# Patient Record
Sex: FEMALE | Race: WHITE | NOT HISPANIC OR LATINO
[De-identification: names, ages, dates, MRNs, and addresses within clinical notes are randomized per-mention and may not be internally consistent; named-entity substitution may affect disease eponyms.]

---

## 2017-10-10 PROBLEM — Z00.00 ENCOUNTER FOR PREVENTIVE HEALTH EXAMINATION: Status: ACTIVE | Noted: 2017-10-10

## 2017-11-09 ENCOUNTER — APPOINTMENT (OUTPATIENT)
Dept: OTOLARYNGOLOGY | Facility: CLINIC | Age: 52
End: 2017-11-09
Payer: COMMERCIAL

## 2017-11-09 VITALS
SYSTOLIC BLOOD PRESSURE: 111 MMHG | DIASTOLIC BLOOD PRESSURE: 67 MMHG | OXYGEN SATURATION: 99 % | HEART RATE: 76 BPM | TEMPERATURE: 98.2 F

## 2017-11-09 VITALS — WEIGHT: 117 LBS | HEIGHT: 62 IN | BODY MASS INDEX: 21.53 KG/M2

## 2017-11-09 DIAGNOSIS — Z86.39 PERSONAL HISTORY OF OTHER ENDOCRINE, NUTRITIONAL AND METABOLIC DISEASE: ICD-10-CM

## 2017-11-09 DIAGNOSIS — R22.1 LOCALIZED SWELLING, MASS AND LUMP, NECK: ICD-10-CM

## 2017-11-09 DIAGNOSIS — J39.8 OTHER SPECIFIED DISEASES OF UPPER RESPIRATORY TRACT: ICD-10-CM

## 2017-11-09 DIAGNOSIS — Z83.49 FAMILY HISTORY OF OTHER ENDOCRINE, NUTRITIONAL AND METABOLIC DISEASES: ICD-10-CM

## 2017-11-09 DIAGNOSIS — Z78.9 OTHER SPECIFIED HEALTH STATUS: ICD-10-CM

## 2017-11-09 PROCEDURE — 99205 OFFICE O/P NEW HI 60 MIN: CPT | Mod: 25

## 2017-11-09 PROCEDURE — 31575 DIAGNOSTIC LARYNGOSCOPY: CPT

## 2017-11-09 RX ORDER — MULTIVITAMIN
TABLET ORAL
Refills: 0 | Status: ACTIVE | COMMUNITY

## 2017-11-22 ENCOUNTER — LABORATORY RESULT (OUTPATIENT)
Age: 52
End: 2017-11-22

## 2017-11-22 VITALS
RESPIRATION RATE: 16 BRPM | HEART RATE: 85 BPM | HEIGHT: 62 IN | WEIGHT: 122.36 LBS | SYSTOLIC BLOOD PRESSURE: 117 MMHG | TEMPERATURE: 98 F | DIASTOLIC BLOOD PRESSURE: 59 MMHG | OXYGEN SATURATION: 99 %

## 2017-11-22 RX ORDER — HYDROCORTISONE 25 MG/G
2.5 CREAM TOPICAL
Qty: 28 | Refills: 0 | Status: ACTIVE | COMMUNITY
Start: 2017-06-30

## 2017-11-24 ENCOUNTER — APPOINTMENT (OUTPATIENT)
Dept: OTOLARYNGOLOGY | Facility: HOSPITAL | Age: 52
End: 2017-11-24

## 2017-11-24 ENCOUNTER — OUTPATIENT (OUTPATIENT)
Dept: OUTPATIENT SERVICES | Facility: HOSPITAL | Age: 52
LOS: 1 days | Discharge: HOME | End: 2017-11-24
Payer: COMMERCIAL

## 2017-11-24 ENCOUNTER — RESULT REVIEW (OUTPATIENT)
Age: 52
End: 2017-11-24

## 2017-11-24 VITALS
DIASTOLIC BLOOD PRESSURE: 57 MMHG | TEMPERATURE: 99 F | OXYGEN SATURATION: 96 % | SYSTOLIC BLOOD PRESSURE: 116 MMHG | RESPIRATION RATE: 21 BRPM | HEART RATE: 100 BPM

## 2017-11-24 DIAGNOSIS — Z90.13 ACQUIRED ABSENCE OF BILATERAL BREASTS AND NIPPLES: Chronic | ICD-10-CM

## 2017-11-24 PROCEDURE — 88307 TISSUE EXAM BY PATHOLOGIST: CPT

## 2017-11-24 PROCEDURE — 95865 NEEDLE EMG LARYNX: CPT | Mod: 26

## 2017-11-24 PROCEDURE — 60220 PARTIAL REMOVAL OF THYROID: CPT

## 2017-11-24 PROCEDURE — 60220 PARTIAL REMOVAL OF THYROID: CPT | Mod: RT

## 2017-11-24 RX ORDER — ONDANSETRON 8 MG/1
2 TABLET, FILM COATED ORAL
Qty: 0 | Refills: 0 | Status: DISCONTINUED | OUTPATIENT
Start: 2017-11-24 | End: 2017-11-24

## 2017-11-24 RX ORDER — KETOROLAC TROMETHAMINE 30 MG/ML
15 SYRINGE (ML) INJECTION ONCE
Qty: 0 | Refills: 0 | Status: DISCONTINUED | OUTPATIENT
Start: 2017-11-24 | End: 2017-11-24

## 2017-11-24 RX ORDER — ACETAMINOPHEN 500 MG
1000 TABLET ORAL ONCE
Qty: 0 | Refills: 0 | Status: DISCONTINUED | OUTPATIENT
Start: 2017-11-24 | End: 2017-11-24

## 2017-11-24 RX ADMIN — Medication 15 MILLIGRAM(S): at 11:42

## 2017-11-24 NOTE — BRIEF OPERATIVE NOTE - OPERATION/FINDINGS
The incision was then carried down through the subcutaneous tissues using the needle tip cautery through the platysma and the strap muscles exposed. A couple of small vessels were ligated in the process. Diminutive R recurrent laryngeal nerve identified and protected during procedure. The right lobe was mobilized medially. It was intermittently adherent to the trachea and care was taken to dissect it carefully. There was no significant bleeding during the procedure once the R lobe and isthmus had been removed and sent to surg path. 10 Fr bard drain was placed. Closed primarily with 3-0 chromic for the strap muscles, and then running subcuticular. Incision carried down through the subcutaneous tissues using the needle tip cautery through the platysma and the strap muscles were exposed. Small vessels were ligated in the process. Diminutive R recurrent laryngeal nerve identified and protected during procedure. The right lobe was mobilized medially. It was intermittently adherent to the trachea and care was taken to dissect it carefully. There was no significant bleeding during the procedure once the R lobe and isthmus had been removed and sent to surg path. 10 Fr bard drain was placed. Closed primarily with vicryl for the strap muscles, and then running subcuticular.

## 2017-11-24 NOTE — PACU DISCHARGE NOTE - COMMENTS
Patient discharged home with significant other. Airway patent. Surgical dressing to neck clean, dry, intact, no swelling. Gait steady. Patient denies pain.

## 2017-11-24 NOTE — BRIEF OPERATIVE NOTE - PROCEDURE
<<-----Click on this checkbox to enter Procedure Thyroid lobectomy, total, right, with isthmusectomy  11/24/2017    Active  ABBIE

## 2017-11-27 ENCOUNTER — APPOINTMENT (OUTPATIENT)
Dept: OTOLARYNGOLOGY | Facility: CLINIC | Age: 52
End: 2017-11-27
Payer: COMMERCIAL

## 2017-11-27 VITALS
SYSTOLIC BLOOD PRESSURE: 112 MMHG | OXYGEN SATURATION: 98 % | HEART RATE: 76 BPM | DIASTOLIC BLOOD PRESSURE: 69 MMHG | TEMPERATURE: 98.4 F

## 2017-11-27 DIAGNOSIS — Z98.890 OTHER SPECIFIED POSTPROCEDURAL STATES: ICD-10-CM

## 2017-11-27 PROBLEM — E04.1 NONTOXIC SINGLE THYROID NODULE: Chronic | Status: ACTIVE | Noted: 2017-11-22

## 2017-11-27 PROBLEM — I95.9 HYPOTENSION, UNSPECIFIED: Chronic | Status: ACTIVE | Noted: 2017-11-22

## 2017-11-27 PROBLEM — C50.919 MALIGNANT NEOPLASM OF UNSPECIFIED SITE OF UNSPECIFIED FEMALE BREAST: Chronic | Status: ACTIVE | Noted: 2017-11-22

## 2017-11-27 LAB — SURGICAL PATHOLOGY STUDY: SIGNIFICANT CHANGE UP

## 2017-11-27 PROCEDURE — 99024 POSTOP FOLLOW-UP VISIT: CPT

## 2017-11-27 PROCEDURE — 31575 DIAGNOSTIC LARYNGOSCOPY: CPT | Mod: 58

## 2017-11-27 RX ORDER — AZITHROMYCIN DIHYDRATE 500 MG/1
500 TABLET, FILM COATED ORAL DAILY
Qty: 1 | Refills: 0 | Status: COMPLETED | COMMUNITY
Start: 2017-11-22 | End: 2017-11-27

## 2017-11-27 RX ORDER — ACETAMINOPHEN AND CODEINE 300; 30 MG/1; MG/1
300-30 TABLET ORAL
Qty: 18 | Refills: 0 | Status: COMPLETED | COMMUNITY
Start: 2017-11-22 | End: 2017-11-27

## 2017-12-11 ENCOUNTER — TRANSCRIPTION ENCOUNTER (OUTPATIENT)
Age: 52
End: 2017-12-11

## 2018-01-16 LAB
25(OH)D3 SERPL-MCNC: 58 NG/ML
T3FREE SERPL-MCNC: 3.01 PG/ML
T4 FREE SERPL-MCNC: 1 NG/DL
TSH SERPL-ACNC: 2.96 UIU/ML

## 2018-01-16 RX ORDER — AMOXICILLIN AND CLAVULANATE POTASSIUM 500; 125 MG/1; MG/1
500-125 TABLET, FILM COATED ORAL
Qty: 10 | Refills: 0 | Status: ACTIVE | COMMUNITY
Start: 2018-01-02

## 2018-01-18 ENCOUNTER — APPOINTMENT (OUTPATIENT)
Dept: OTOLARYNGOLOGY | Facility: CLINIC | Age: 53
End: 2018-01-18
Payer: COMMERCIAL

## 2018-01-18 VITALS
SYSTOLIC BLOOD PRESSURE: 102 MMHG | HEART RATE: 74 BPM | TEMPERATURE: 98.1 F | OXYGEN SATURATION: 97 % | DIASTOLIC BLOOD PRESSURE: 63 MMHG

## 2018-01-18 PROCEDURE — 99024 POSTOP FOLLOW-UP VISIT: CPT

## 2018-05-08 LAB
25(OH)D3 SERPL-MCNC: 50.4 NG/ML
T3FREE SERPL-MCNC: 2.7 PG/ML
T4 FREE SERPL-MCNC: 1 NG/DL
TSH SERPL-ACNC: 1.86 UIU/ML

## 2018-08-10 ENCOUNTER — LABORATORY RESULT (OUTPATIENT)
Age: 53
End: 2018-08-10

## 2018-08-16 ENCOUNTER — APPOINTMENT (OUTPATIENT)
Dept: OTOLARYNGOLOGY | Facility: CLINIC | Age: 53
End: 2018-08-16
Payer: COMMERCIAL

## 2018-08-16 VITALS — SYSTOLIC BLOOD PRESSURE: 100 MMHG | DIASTOLIC BLOOD PRESSURE: 66 MMHG | OXYGEN SATURATION: 98 % | HEART RATE: 75 BPM

## 2018-08-16 PROCEDURE — G0268 REMOVAL OF IMPACTED WAX MD: CPT

## 2018-08-16 PROCEDURE — 99213 OFFICE O/P EST LOW 20 MIN: CPT | Mod: 25

## 2018-08-18 LAB — T3FREE SERPL-MCNC: 2.53 PG/ML

## 2018-12-17 ENCOUNTER — FORM ENCOUNTER (OUTPATIENT)
Age: 53
End: 2018-12-17

## 2018-12-18 ENCOUNTER — APPOINTMENT (OUTPATIENT)
Dept: ULTRASOUND IMAGING | Facility: HOSPITAL | Age: 53
End: 2018-12-18

## 2018-12-18 ENCOUNTER — OUTPATIENT (OUTPATIENT)
Dept: OUTPATIENT SERVICES | Facility: HOSPITAL | Age: 53
LOS: 1 days | End: 2018-12-18
Payer: COMMERCIAL

## 2018-12-18 DIAGNOSIS — Z90.13 ACQUIRED ABSENCE OF BILATERAL BREASTS AND NIPPLES: Chronic | ICD-10-CM

## 2018-12-18 PROCEDURE — 76536 US EXAM OF HEAD AND NECK: CPT

## 2018-12-18 PROCEDURE — 76536 US EXAM OF HEAD AND NECK: CPT | Mod: 26

## 2019-10-03 ENCOUNTER — TRANSCRIPTION ENCOUNTER (OUTPATIENT)
Age: 54
End: 2019-10-03

## 2019-10-07 ENCOUNTER — APPOINTMENT (OUTPATIENT)
Dept: OTOLARYNGOLOGY | Facility: CLINIC | Age: 54
End: 2019-10-07
Payer: COMMERCIAL

## 2019-10-07 VITALS
TEMPERATURE: 98.1 F | SYSTOLIC BLOOD PRESSURE: 106 MMHG | OXYGEN SATURATION: 99 % | HEART RATE: 71 BPM | DIASTOLIC BLOOD PRESSURE: 67 MMHG

## 2019-10-07 PROCEDURE — G0268 REMOVAL OF IMPACTED WAX MD: CPT | Mod: LT

## 2019-10-07 PROCEDURE — 99214 OFFICE O/P EST MOD 30 MIN: CPT | Mod: 25

## 2019-10-07 RX ORDER — AZELASTINE HYDROCHLORIDE 205.5 UG/1
0.15 SPRAY, METERED NASAL
Qty: 30 | Refills: 0 | Status: ACTIVE | COMMUNITY
Start: 2019-04-30

## 2019-10-07 NOTE — CONSULT LETTER
[Dear  ___] : Dear  [unfilled], [Consult Letter:] : I had the pleasure of evaluating your patient, [unfilled]. [Please see my note below.] : Please see my note below. [Consult Closing:] : Thank you very much for allowing me to participate in the care of this patient.  If you have any questions, please do not hesitate to contact me. [Sincerely,] : Sincerely, [FreeTextEntry3] : \par Philip Milenr M.D., FACS, ECNU\par Director [facility] Thyroid [facility] Parathyroid Surgery\par [facility]\par Certified in Thyroid/Parathyroid/Neck Ultrasound, ECNU/ AIUM\par \par , Department of Otolaryngology\par [employer] [facility] [employer] [facility]\par

## 2019-10-07 NOTE — PROCEDURE
[Cerumen Impaction] : Cerumen Impaction [Same] : same as the Pre Op Dx. [] : Removal of Cerumen [FreeTextEntry1] : Cerumen AS [FreeTextEntry6] : copious cerumen removed AS with suction and curettes, TM normal

## 2019-10-07 NOTE — HISTORY OF PRESENT ILLNESS
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  She had a nodule in the right lobe that measured 3.9 x 1.8 x 2.6 cm and had started thyroid hormone.  She took this for  ~ 20 years but after her last pregnancy she stopped taking it.  She was followed for 8-9 years by Dr. Anand at [facility] and was stable on US.  She then saw Mary Acuna MD her new Endocrinologist and an FNA biopsy was done with the [facility] but not reflexed as the cytology was benign last August. She remains euthyroid but on last ultrasound in August the nodule in the right lobe had increased now to 5.28 cm.  Of interest she had started taking Kelp tablets after treatment for lobular infiltrating breast cancer treated with double mastectomy last April.  She stopped taking the Kelp in August of this year when the nodule was noted to have grown significantly despite relative stable size of the entire gland. The nodule is more visible now and she feels an intermittent sense of pressure with certain head positions but denies SOB at rest, dysphagia, neck pain or recent voice changes. Her mother had a thyroidectomy for a presumed toxic goiter/nodule but there is no family history of thyroid cancer.  She has no known radiation exposures in her youth. SHe now desires excision due to the growth of the nodule and compressive symptoms, referred by her Endocrinologist. \par  [FreeTextEntry1] : Ophelia returns for her follow up visit after a right hemithyroidectomy for a goiter on 11/24/17. She is no longer clearing her throat excessively and her voice is better.   Her surgical path was c/w adenomatous hyperplasia. She had early TFTs that were in the normal range on 1/12/17. Her TSH was slightly elevated last year.  Overall her TFTs are stable and she feels well. Her weight has been stable.  She has a subcentimeter left lobe nodule.

## 2019-10-07 NOTE — REASON FOR VISIT
[FreeTextEntry2] : a followup visit after partial thyroidectomy.  [FreeTextEntry1] : Referred by Mary Acuna MD Endocrinologist, PCP is Teo Saeed MD

## 2019-10-10 LAB
T3FREE SERPL-MCNC: 2.98 PG/ML
T4 FREE SERPL-MCNC: 1.4 NG/DL
THYROGLOB AB SERPL-ACNC: <20 IU/ML
THYROPEROXIDASE AB SERPL IA-ACNC: <10 IU/ML
TSH SERPL-ACNC: 3.01 UIU/ML

## 2019-10-27 ENCOUNTER — INPATIENT (INPATIENT)
Facility: HOSPITAL | Age: 54
LOS: 0 days | Discharge: HOME | DRG: 694 | End: 2019-10-28
Attending: UROLOGY | Admitting: UROLOGY
Payer: COMMERCIAL

## 2019-10-27 VITALS
OXYGEN SATURATION: 98 % | DIASTOLIC BLOOD PRESSURE: 55 MMHG | WEIGHT: 125 LBS | HEART RATE: 77 BPM | TEMPERATURE: 98 F | SYSTOLIC BLOOD PRESSURE: 119 MMHG | HEIGHT: 62 IN | RESPIRATION RATE: 20 BRPM

## 2019-10-27 DIAGNOSIS — N20.0 CALCULUS OF KIDNEY: ICD-10-CM

## 2019-10-27 DIAGNOSIS — Z90.13 ACQUIRED ABSENCE OF BILATERAL BREASTS AND NIPPLES: Chronic | ICD-10-CM

## 2019-10-27 LAB
ALBUMIN SERPL ELPH-MCNC: 4.4 G/DL — SIGNIFICANT CHANGE UP (ref 3.3–5)
ALP SERPL-CCNC: 76 U/L — SIGNIFICANT CHANGE UP (ref 40–120)
ALT FLD-CCNC: 30 U/L — SIGNIFICANT CHANGE UP (ref 10–45)
ANION GAP SERPL CALC-SCNC: 15 MMOL/L — SIGNIFICANT CHANGE UP (ref 5–17)
APPEARANCE UR: CLEAR — SIGNIFICANT CHANGE UP
AST SERPL-CCNC: 31 U/L — SIGNIFICANT CHANGE UP (ref 10–40)
BILIRUB SERPL-MCNC: 0.2 MG/DL — SIGNIFICANT CHANGE UP (ref 0.2–1.2)
BILIRUB UR-MCNC: NEGATIVE — SIGNIFICANT CHANGE UP
BUN SERPL-MCNC: 20 MG/DL — SIGNIFICANT CHANGE UP (ref 7–23)
CALCIUM SERPL-MCNC: 9.4 MG/DL — SIGNIFICANT CHANGE UP (ref 8.4–10.5)
CHLORIDE SERPL-SCNC: 100 MMOL/L — SIGNIFICANT CHANGE UP (ref 96–108)
CO2 SERPL-SCNC: 25 MMOL/L — SIGNIFICANT CHANGE UP (ref 22–31)
COLOR SPEC: YELLOW — SIGNIFICANT CHANGE UP
CREAT SERPL-MCNC: 0.64 MG/DL — SIGNIFICANT CHANGE UP (ref 0.5–1.3)
DIFF PNL FLD: ABNORMAL
EXTRA BLUE TOP TUBE: SIGNIFICANT CHANGE UP
EXTRA SST TUBE: SIGNIFICANT CHANGE UP
GLUCOSE SERPL-MCNC: 126 MG/DL — HIGH (ref 70–99)
GLUCOSE UR QL: NEGATIVE — SIGNIFICANT CHANGE UP
HCT VFR BLD CALC: 43.4 % — SIGNIFICANT CHANGE UP (ref 34.5–45)
HGB BLD-MCNC: 14.4 G/DL — SIGNIFICANT CHANGE UP (ref 11.5–15.5)
KETONES UR-MCNC: >=80 MG/DL
LEUKOCYTE ESTERASE UR-ACNC: NEGATIVE — SIGNIFICANT CHANGE UP
MCHC RBC-ENTMCNC: 31 PG — SIGNIFICANT CHANGE UP (ref 27–34)
MCHC RBC-ENTMCNC: 33.2 GM/DL — SIGNIFICANT CHANGE UP (ref 32–36)
MCV RBC AUTO: 93.3 FL — SIGNIFICANT CHANGE UP (ref 80–100)
NITRITE UR-MCNC: NEGATIVE — SIGNIFICANT CHANGE UP
NRBC # BLD: 0 /100 WBCS — SIGNIFICANT CHANGE UP (ref 0–0)
PH UR: 5.5 — SIGNIFICANT CHANGE UP (ref 5–8)
PLATELET # BLD AUTO: 265 K/UL — SIGNIFICANT CHANGE UP (ref 150–400)
POTASSIUM SERPL-MCNC: 3.6 MMOL/L — SIGNIFICANT CHANGE UP (ref 3.5–5.3)
POTASSIUM SERPL-SCNC: 3.6 MMOL/L — SIGNIFICANT CHANGE UP (ref 3.5–5.3)
PROT SERPL-MCNC: 7.1 G/DL — SIGNIFICANT CHANGE UP (ref 6–8.3)
PROT UR-MCNC: NEGATIVE MG/DL — SIGNIFICANT CHANGE UP
RBC # BLD: 4.65 M/UL — SIGNIFICANT CHANGE UP (ref 3.8–5.2)
RBC # FLD: 12.4 % — SIGNIFICANT CHANGE UP (ref 10.3–14.5)
SODIUM SERPL-SCNC: 140 MMOL/L — SIGNIFICANT CHANGE UP (ref 135–145)
SP GR SPEC: 1.02 — SIGNIFICANT CHANGE UP (ref 1–1.03)
UROBILINOGEN FLD QL: 0.2 E.U./DL — SIGNIFICANT CHANGE UP
WBC # BLD: 15.76 K/UL — HIGH (ref 3.8–10.5)
WBC # FLD AUTO: 15.76 K/UL — HIGH (ref 3.8–10.5)

## 2019-10-27 PROCEDURE — 74176 CT ABD & PELVIS W/O CONTRAST: CPT | Mod: 26

## 2019-10-27 PROCEDURE — 99285 EMERGENCY DEPT VISIT HI MDM: CPT

## 2019-10-27 RX ORDER — INFLUENZA VIRUS VACCINE 15; 15; 15; 15 UG/.5ML; UG/.5ML; UG/.5ML; UG/.5ML
0.5 SUSPENSION INTRAMUSCULAR ONCE
Refills: 0 | Status: DISCONTINUED | OUTPATIENT
Start: 2019-10-27 | End: 2019-10-28

## 2019-10-27 RX ORDER — CEFTRIAXONE 500 MG/1
1000 INJECTION, POWDER, FOR SOLUTION INTRAMUSCULAR; INTRAVENOUS ONCE
Refills: 0 | Status: DISCONTINUED | OUTPATIENT
Start: 2019-10-27 | End: 2019-10-27

## 2019-10-27 RX ORDER — CEFTRIAXONE 500 MG/1
1000 INJECTION, POWDER, FOR SOLUTION INTRAMUSCULAR; INTRAVENOUS ONCE
Refills: 0 | Status: COMPLETED | OUTPATIENT
Start: 2019-10-27 | End: 2019-10-27

## 2019-10-27 RX ORDER — ACETAMINOPHEN 500 MG
650 TABLET ORAL EVERY 6 HOURS
Refills: 0 | Status: DISCONTINUED | OUTPATIENT
Start: 2019-10-27 | End: 2019-10-28

## 2019-10-27 RX ORDER — ONDANSETRON 8 MG/1
4 TABLET, FILM COATED ORAL EVERY 8 HOURS
Refills: 0 | Status: DISCONTINUED | OUTPATIENT
Start: 2019-10-27 | End: 2019-10-28

## 2019-10-27 RX ORDER — SODIUM CHLORIDE 9 MG/ML
1000 INJECTION INTRAMUSCULAR; INTRAVENOUS; SUBCUTANEOUS ONCE
Refills: 0 | Status: COMPLETED | OUTPATIENT
Start: 2019-10-27 | End: 2019-10-27

## 2019-10-27 RX ORDER — KETOROLAC TROMETHAMINE 30 MG/ML
15 SYRINGE (ML) INJECTION EVERY 6 HOURS
Refills: 0 | Status: DISCONTINUED | OUTPATIENT
Start: 2019-10-27 | End: 2019-10-28

## 2019-10-27 RX ORDER — SODIUM CHLORIDE 9 MG/ML
1000 INJECTION INTRAMUSCULAR; INTRAVENOUS; SUBCUTANEOUS
Refills: 0 | Status: DISCONTINUED | OUTPATIENT
Start: 2019-10-27 | End: 2019-10-28

## 2019-10-27 RX ORDER — ONDANSETRON 8 MG/1
4 TABLET, FILM COATED ORAL ONCE
Refills: 0 | Status: COMPLETED | OUTPATIENT
Start: 2019-10-27 | End: 2019-10-27

## 2019-10-27 RX ORDER — KETOROLAC TROMETHAMINE 30 MG/ML
15 SYRINGE (ML) INJECTION ONCE
Refills: 0 | Status: DISCONTINUED | OUTPATIENT
Start: 2019-10-27 | End: 2019-10-27

## 2019-10-27 RX ADMIN — CEFTRIAXONE 100 MILLIGRAM(S): 500 INJECTION, POWDER, FOR SOLUTION INTRAMUSCULAR; INTRAVENOUS at 18:10

## 2019-10-27 RX ADMIN — ONDANSETRON 4 MILLIGRAM(S): 8 TABLET, FILM COATED ORAL at 15:32

## 2019-10-27 RX ADMIN — Medication 15 MILLIGRAM(S): at 15:32

## 2019-10-27 RX ADMIN — SODIUM CHLORIDE 1000 MILLILITER(S): 9 INJECTION INTRAMUSCULAR; INTRAVENOUS; SUBCUTANEOUS at 15:32

## 2019-10-27 RX ADMIN — Medication 15 MILLIGRAM(S): at 18:11

## 2019-10-27 RX ADMIN — SODIUM CHLORIDE 125 MILLILITER(S): 9 INJECTION INTRAMUSCULAR; INTRAVENOUS; SUBCUTANEOUS at 18:10

## 2019-10-27 RX ADMIN — Medication 650 MILLIGRAM(S): at 19:56

## 2019-10-27 NOTE — H&P ADULT - NSHPLABSRESULTS_GEN_ALL_CORE
14.4   15.76 )-----------( 265      ( 27 Oct 2019 15:41 )             43.4   10-27    140  |  100  |  20  ----------------------------<  126<H>  3.6   |  25  |  0.64    Ca    9.4      27 Oct 2019 15:41    TPro  7.1  /  Alb  4.4  /  TBili  0.2  /  DBili  x   /  AST  31  /  ALT  30  /  AlkPhos  76  10-27  Urinalysis Basic - ( 27 Oct 2019 17:03 )    Color: Yellow / Appearance: Clear / S.025 / pH: x  Gluc: x / Ketone: >=80 mg/dL  / Bili: Negative / Urobili: 0.2 E.U./dL   Blood: x / Protein: NEGATIVE mg/dL / Nitrite: NEGATIVE   Leuk Esterase: NEGATIVE / RBC: > 10 /HPF / WBC < 5 /HPF   Sq Epi: x / Non Sq Epi: 0-5 /HPF / Bacteria: Present /HPF    < from: CT Abdomen and Pelvis No Cont (10.27.19 @ 16:20) >    Impression: Right UVJ stone with mild right hydroureteronephrosis.      < end of copied text >

## 2019-10-27 NOTE — ED PROVIDER NOTE - OBJECTIVE STATEMENT
53 y/o F with PMHx of breast cancer s/p bilateral mastectomy, kidney stone presents to the ED with sudden onset of waxing and waning sharp pain to the R groin. Patient notes pain radiates to the R flank/back. Patient notes pain is similar to prior kidney stone with associated nausea, vomiting and diarrhea. Patient denies hematuria, dysuria, urinary frequency, blood in stool or any other acute complaints.

## 2019-10-27 NOTE — ED PROVIDER NOTE - ATTENDING CONTRIBUTION TO CARE
55yo F hx of breast ca s/p bilateral mastectomy, hx kidney stones, here w/ sudden onset RLQ pain and N/V/D. no infectious symptoms, similar to prior kidney stone pain. VSS, distressed and in pain, no CVA tenderness, no abdominal tenderness. found on CT to have 5mm stone,  consulted

## 2019-10-27 NOTE — ED PROVIDER NOTE - PHYSICAL EXAMINATION
VITAL SIGNS: I have reviewed nursing notes and confirm.  CONSTITUTIONAL: Well-developed; well-nourished; uncomfortable and writhing in pain.  SKIN:  warm and dry, no acute rash.   HEAD:  normocephalic, atraumatic.  EYES: PERRL, EOM intact; conjunctiva and sclera clear.  ENT: No nasal discharge; airway clear.   NECK: Supple; non tender.  CARD: S1, S2 normal; no murmurs, gallops, or rubs. Regular rate and rhythm.   RESP:  Clear to auscultation b/l, no wheezes, rales or rhonchi.  ABD: Normal bowel sounds; soft; non-distended; non-tender; no guarding/ rebound.  EXT: Normal ROM. No clubbing, cyanosis or edema. 2+ pulses to b/l ue/le.  NEURO: Alert, oriented, grossly unremarkable  PSYCH: Cooperative, mood and affect appropriate.

## 2019-10-27 NOTE — H&P ADULT - HISTORY OF PRESENT ILLNESS
53 yo female with h/o Kidney stones, breast Ca- s/p bilat mastectomy, thyroid nodule- s/p partial thyroidectomy, presented to ER this afternoon c/o severe R flank pain which began at about noon with N/V. No fever/chills. No dysuria/hematuria.   CT a/p c/w 5mm R UVJ stone with mild hydro. Patient received toradol and zofran in ER with pain relief.

## 2019-10-27 NOTE — ED PROVIDER NOTE - CLINICAL SUMMARY MEDICAL DECISION MAKING FREE TEXT BOX
53 y/o F with PMHx of breast cancer s/p bilateral mastectomy and kidney stones presents with pain to R groin radiating to R back with nausea, vomiting, and diarrhea. No fever, chills or urinary symptoms. Patient is uncomfortable and writhing in pain. Abdomen is soft and non-tender with no CVA tenderness. Will treat pain and obtain CT to rule out stone.

## 2019-10-27 NOTE — ED ADULT NURSE NOTE - OBJECTIVE STATEMENT
pt to ER w/ report of sudden onset of R flank pain approx 90 min PTA in ER.  Pt reports nausea, vomiting and diarrhea accompanying pain 10/10.  Hx kidney stones.  Pt denies cp/sob/f/c.  Breathing unlabored, skin warm and dry. IV access established, labs drawn and sent. IV pain meds and antiemetics and fluids given per PA order.  Will continue to monitor.

## 2019-10-27 NOTE — H&P ADULT - ATTENDING COMMENTS
Patient admitted for observation.  She prefers to try to pass her stone.  Will repeat labs in Am to monitor her WBC which was moderately elevated in the ED.

## 2019-10-28 ENCOUNTER — TRANSCRIPTION ENCOUNTER (OUTPATIENT)
Age: 54
End: 2019-10-28

## 2019-10-28 VITALS
DIASTOLIC BLOOD PRESSURE: 66 MMHG | RESPIRATION RATE: 16 BRPM | SYSTOLIC BLOOD PRESSURE: 113 MMHG | TEMPERATURE: 99 F | HEART RATE: 66 BPM | OXYGEN SATURATION: 97 %

## 2019-10-28 LAB
ANION GAP SERPL CALC-SCNC: 10 MMOL/L — SIGNIFICANT CHANGE UP (ref 5–17)
APTT BLD: 29.2 SEC — SIGNIFICANT CHANGE UP (ref 27.5–36.3)
BUN SERPL-MCNC: 15 MG/DL — SIGNIFICANT CHANGE UP (ref 7–23)
CALCIUM SERPL-MCNC: 8.1 MG/DL — LOW (ref 8.4–10.5)
CHLORIDE SERPL-SCNC: 104 MMOL/L — SIGNIFICANT CHANGE UP (ref 96–108)
CO2 SERPL-SCNC: 24 MMOL/L — SIGNIFICANT CHANGE UP (ref 22–31)
CREAT SERPL-MCNC: 0.72 MG/DL — SIGNIFICANT CHANGE UP (ref 0.5–1.3)
GLUCOSE SERPL-MCNC: 112 MG/DL — HIGH (ref 70–99)
HCT VFR BLD CALC: 37 % — SIGNIFICANT CHANGE UP (ref 34.5–45)
HGB BLD-MCNC: 12.6 G/DL — SIGNIFICANT CHANGE UP (ref 11.5–15.5)
INR BLD: 1.07 — SIGNIFICANT CHANGE UP (ref 0.88–1.16)
MAGNESIUM SERPL-MCNC: 1.8 MG/DL — SIGNIFICANT CHANGE UP (ref 1.6–2.6)
MCHC RBC-ENTMCNC: 30.7 PG — SIGNIFICANT CHANGE UP (ref 27–34)
MCHC RBC-ENTMCNC: 34.1 GM/DL — SIGNIFICANT CHANGE UP (ref 32–36)
MCV RBC AUTO: 90.2 FL — SIGNIFICANT CHANGE UP (ref 80–100)
NRBC # BLD: 0 /100 WBCS — SIGNIFICANT CHANGE UP (ref 0–0)
PHOSPHATE SERPL-MCNC: 3.3 MG/DL — SIGNIFICANT CHANGE UP (ref 2.5–4.5)
PLATELET # BLD AUTO: 196 K/UL — SIGNIFICANT CHANGE UP (ref 150–400)
POTASSIUM SERPL-MCNC: 3.9 MMOL/L — SIGNIFICANT CHANGE UP (ref 3.5–5.3)
POTASSIUM SERPL-SCNC: 3.9 MMOL/L — SIGNIFICANT CHANGE UP (ref 3.5–5.3)
PROTHROM AB SERPL-ACNC: 12.1 SEC — SIGNIFICANT CHANGE UP (ref 10–12.9)
RBC # BLD: 4.1 M/UL — SIGNIFICANT CHANGE UP (ref 3.8–5.2)
RBC # FLD: 12.5 % — SIGNIFICANT CHANGE UP (ref 10.3–14.5)
SODIUM SERPL-SCNC: 138 MMOL/L — SIGNIFICANT CHANGE UP (ref 135–145)
WBC # BLD: 11.06 K/UL — HIGH (ref 3.8–10.5)
WBC # FLD AUTO: 11.06 K/UL — HIGH (ref 3.8–10.5)

## 2019-10-28 PROCEDURE — 86900 BLOOD TYPING SEROLOGIC ABO: CPT

## 2019-10-28 PROCEDURE — 85730 THROMBOPLASTIN TIME PARTIAL: CPT

## 2019-10-28 PROCEDURE — 87086 URINE CULTURE/COLONY COUNT: CPT

## 2019-10-28 PROCEDURE — 74176 CT ABD & PELVIS W/O CONTRAST: CPT

## 2019-10-28 PROCEDURE — 96374 THER/PROPH/DIAG INJ IV PUSH: CPT

## 2019-10-28 PROCEDURE — 83735 ASSAY OF MAGNESIUM: CPT

## 2019-10-28 PROCEDURE — 84100 ASSAY OF PHOSPHORUS: CPT

## 2019-10-28 PROCEDURE — 85610 PROTHROMBIN TIME: CPT

## 2019-10-28 PROCEDURE — 80048 BASIC METABOLIC PNL TOTAL CA: CPT

## 2019-10-28 PROCEDURE — 96375 TX/PRO/DX INJ NEW DRUG ADDON: CPT

## 2019-10-28 PROCEDURE — 85027 COMPLETE CBC AUTOMATED: CPT

## 2019-10-28 PROCEDURE — 86850 RBC ANTIBODY SCREEN: CPT

## 2019-10-28 PROCEDURE — 86901 BLOOD TYPING SEROLOGIC RH(D): CPT

## 2019-10-28 PROCEDURE — 36415 COLL VENOUS BLD VENIPUNCTURE: CPT

## 2019-10-28 PROCEDURE — 99285 EMERGENCY DEPT VISIT HI MDM: CPT | Mod: 25

## 2019-10-28 PROCEDURE — 80053 COMPREHEN METABOLIC PANEL: CPT

## 2019-10-28 PROCEDURE — 81001 URINALYSIS AUTO W/SCOPE: CPT

## 2019-10-28 RX ADMIN — Medication 15 MILLIGRAM(S): at 01:56

## 2019-10-28 RX ADMIN — Medication 15 MILLIGRAM(S): at 00:54

## 2019-10-28 NOTE — DISCHARGE NOTE PROVIDER - HOSPITAL COURSE
Patient is a 55 y/o female with hx of Breast CA s/p mastectomy b/l, partial thyroidectomy admitted for 5mm R UVJ stone with mild hydro and wbc 15. This morning pain controlled. wbc 11. Tolerating PO, VSS. HDS for discharge.

## 2019-10-28 NOTE — PROGRESS NOTE ADULT - ATTENDING COMMENTS
Patient seen and examined this morning she is afebrile and pain free.  Her WBC is down to 11.  She would like to go home and try MET. She understands that she may have pain again and may have to return to the ED, especially if she develops signs of infection (i.e. fever and shaking chills).  She will follow up in the office next week.

## 2019-10-28 NOTE — PROGRESS NOTE ADULT - SUBJECTIVE AND OBJECTIVE BOX
INTERVAL HPI/OVERNIGHT EVENTS:  No acute events overnight.    VITALS:    T(F): 98.7 (10-28-19 @ 05:43), Max: 99.1 (10-27-19 @ 20:35)  HR: 66 (10-28-19 @ 05:43) (56 - 77)  BP: 113/66 (10-28-19 @ 05:43) (113/66 - 127/76)  RR: 16 (10-28-19 @ 05:43) (16 - 20)  SpO2: 97% (10-28-19 @ 05:43) (97% - 99%)  Wt(kg): --    I&O's Detail    27 Oct 2019 07:01  -  28 Oct 2019 06:32  --------------------------------------------------------  IN:    Oral Fluid: 100 mL    sodium chloride 0.9%.: 1500 mL  Total IN: 1600 mL    OUT:    Voided: 1000 mL  Total OUT: 1000 mL    Total NET: 600 mL          MEDICATIONS:    ANTIBIOTICS:      PAIN CONTROL:  acetaminophen   Tablet .. 650 milliGRAM(s) Oral every 6 hours PRN  ketorolac   Injectable 15 milliGRAM(s) IV Push every 6 hours PRN  ondansetron Injectable 4 milliGRAM(s) IV Push every 8 hours PRN       MEDS:      HEME/ONC        PHYSICAL EXAM:  General: No acute distress.  Alert and Oriented  Abdominal Exam: soft, NT, ND   Exam: no cvat      LABS:                        14.4   15. )-----------( 265      ( 27 Oct 2019 15:41 )             43.4     10-27    140  |  100  |  20  ----------------------------<  126<H>  3.6   |  25  |  0.64    Ca    9.4      27 Oct 2019 15:41    TPro  7.1  /  Alb  4.4  /  TBili  0.2  /  DBili  x   /  AST  31  /  ALT  30  /  AlkPhos  76  10-27      Urinalysis Basic - ( 27 Oct 2019 17:03 )    Color: Yellow / Appearance: Clear / S.025 / pH: x  Gluc: x / Ketone: >=80 mg/dL  / Bili: Negative / Urobili: 0.2 E.U./dL   Blood: x / Protein: NEGATIVE mg/dL / Nitrite: NEGATIVE   Leuk Esterase: NEGATIVE / RBC: > 10 /HPF / WBC < 5 /HPF   Sq Epi: x / Non Sq Epi: 0-5 /HPF / Bacteria: Present /HPF        RADIOLOGY & ADDITIONAL TESTS:

## 2019-10-28 NOTE — DISCHARGE NOTE NURSING/CASE MANAGEMENT/SOCIAL WORK - PATIENT PORTAL LINK FT
You can access the FollowMyHealth Patient Portal offered by [facility] by registering at the following website: [email or web address]. By joining [employer]’s FollowMyHealth portal, you will also be able to view your health information using other applications (apps) compatible with our system.

## 2019-10-28 NOTE — DISCHARGE NOTE PROVIDER - NSDCCPCAREPLAN_GEN_ALL_CORE_FT
PRINCIPAL DISCHARGE DIAGNOSIS  Diagnosis: Kidney stone on right side  Assessment and Plan of Treatment:

## 2019-10-28 NOTE — DISCHARGE NOTE PROVIDER - NSDCFUADDINST_GEN_ALL_CORE_FT
Regular diet, activity as tolerated. No heavy lifting or straining. Stay well hydrated. If fever >100.4 or any change or worsening of symptoms please call doctor or report to ED. Make follow up appointment with Dr Ybarra.    Tylenol, motrin for pain. Percocet for severe pain.

## 2019-10-28 NOTE — DISCHARGE NOTE PROVIDER - CARE PROVIDER_API CALL
Robbie Ybarra)  Urology  [address] [address]  Phone: [phone]  Fax: [phone]  Follow Up Time:

## 2019-10-29 LAB
CULTURE RESULTS: SIGNIFICANT CHANGE UP
SPECIMEN SOURCE: SIGNIFICANT CHANGE UP

## 2019-10-31 ENCOUNTER — INPATIENT (INPATIENT)
Facility: HOSPITAL | Age: 54
LOS: 0 days | Discharge: HOME | DRG: 661 | End: 2019-11-01
Attending: UROLOGY | Admitting: UROLOGY
Payer: COMMERCIAL

## 2019-10-31 VITALS
TEMPERATURE: 99 F | HEIGHT: 64 IN | WEIGHT: 125 LBS | RESPIRATION RATE: 16 BRPM | OXYGEN SATURATION: 98 % | SYSTOLIC BLOOD PRESSURE: 113 MMHG | HEART RATE: 87 BPM | DIASTOLIC BLOOD PRESSURE: 71 MMHG

## 2019-10-31 DIAGNOSIS — Z90.13 ACQUIRED ABSENCE OF BILATERAL BREASTS AND NIPPLES: Chronic | ICD-10-CM

## 2019-10-31 DIAGNOSIS — Z90.09 ACQUIRED ABSENCE OF OTHER PART OF HEAD AND NECK: Chronic | ICD-10-CM

## 2019-10-31 PROBLEM — N20.0 CALCULUS OF KIDNEY: Chronic | Status: ACTIVE | Noted: 2019-10-27

## 2019-10-31 LAB
ALBUMIN SERPL ELPH-MCNC: 4.1 G/DL — SIGNIFICANT CHANGE UP (ref 3.3–5)
ALP SERPL-CCNC: 71 U/L — SIGNIFICANT CHANGE UP (ref 40–120)
ALT FLD-CCNC: 29 U/L — SIGNIFICANT CHANGE UP (ref 10–45)
ANION GAP SERPL CALC-SCNC: 11 MMOL/L — SIGNIFICANT CHANGE UP (ref 5–17)
APPEARANCE UR: CLEAR — SIGNIFICANT CHANGE UP
APTT BLD: 29 SEC — SIGNIFICANT CHANGE UP (ref 27.5–36.3)
AST SERPL-CCNC: 18 U/L — SIGNIFICANT CHANGE UP (ref 10–40)
BACTERIA # UR AUTO: PRESENT /HPF
BASOPHILS # BLD AUTO: 0.18 K/UL — SIGNIFICANT CHANGE UP (ref 0–0.2)
BASOPHILS NFR BLD AUTO: 0.9 % — SIGNIFICANT CHANGE UP (ref 0–2)
BILIRUB SERPL-MCNC: 0.5 MG/DL — SIGNIFICANT CHANGE UP (ref 0.2–1.2)
BILIRUB UR-MCNC: ABNORMAL
BUN SERPL-MCNC: 15 MG/DL — SIGNIFICANT CHANGE UP (ref 7–23)
CALCIUM SERPL-MCNC: 9.3 MG/DL — SIGNIFICANT CHANGE UP (ref 8.4–10.5)
CHLORIDE SERPL-SCNC: 95 MMOL/L — LOW (ref 96–108)
CO2 SERPL-SCNC: 28 MMOL/L — SIGNIFICANT CHANGE UP (ref 22–31)
COLOR SPEC: YELLOW — SIGNIFICANT CHANGE UP
COMMENT - URINE: SIGNIFICANT CHANGE UP
CREAT SERPL-MCNC: 0.84 MG/DL — SIGNIFICANT CHANGE UP (ref 0.5–1.3)
DACRYOCYTES BLD QL SMEAR: SLIGHT — SIGNIFICANT CHANGE UP
DIFF PNL FLD: ABNORMAL
EOSINOPHIL # BLD AUTO: 0 K/UL — SIGNIFICANT CHANGE UP (ref 0–0.5)
EOSINOPHIL NFR BLD AUTO: 0 % — SIGNIFICANT CHANGE UP (ref 0–6)
EPI CELLS # UR: SIGNIFICANT CHANGE UP /HPF (ref 0–5)
GLUCOSE SERPL-MCNC: 99 MG/DL — SIGNIFICANT CHANGE UP (ref 70–99)
GLUCOSE UR QL: NEGATIVE — SIGNIFICANT CHANGE UP
HCG UR QL: NEGATIVE — SIGNIFICANT CHANGE UP
HCT VFR BLD CALC: 40.5 % — SIGNIFICANT CHANGE UP (ref 34.5–45)
HGB BLD-MCNC: 13.9 G/DL — SIGNIFICANT CHANGE UP (ref 11.5–15.5)
INR BLD: 1.09 — SIGNIFICANT CHANGE UP (ref 0.88–1.16)
KETONES UR-MCNC: >=80 MG/DL
LEUKOCYTE ESTERASE UR-ACNC: NEGATIVE — SIGNIFICANT CHANGE UP
LYMPHOCYTES # BLD AUTO: 1.36 K/UL — SIGNIFICANT CHANGE UP (ref 1–3.3)
LYMPHOCYTES # BLD AUTO: 6.9 % — LOW (ref 13–44)
MACROCYTES BLD QL: SLIGHT — SIGNIFICANT CHANGE UP
MANUAL SMEAR VERIFICATION: SIGNIFICANT CHANGE UP
MCHC RBC-ENTMCNC: 31.4 PG — SIGNIFICANT CHANGE UP (ref 27–34)
MCHC RBC-ENTMCNC: 34.3 GM/DL — SIGNIFICANT CHANGE UP (ref 32–36)
MCV RBC AUTO: 91.4 FL — SIGNIFICANT CHANGE UP (ref 80–100)
MICROCYTES BLD QL: SLIGHT — SIGNIFICANT CHANGE UP
MONOCYTES # BLD AUTO: 0.69 K/UL — SIGNIFICANT CHANGE UP (ref 0–0.9)
MONOCYTES NFR BLD AUTO: 3.5 % — SIGNIFICANT CHANGE UP (ref 2–14)
NEUTROPHILS # BLD AUTO: 17.44 K/UL — HIGH (ref 1.8–7.4)
NEUTROPHILS NFR BLD AUTO: 88.7 % — HIGH (ref 43–77)
NITRITE UR-MCNC: NEGATIVE — SIGNIFICANT CHANGE UP
PH UR: 6 — SIGNIFICANT CHANGE UP (ref 5–8)
PLAT MORPH BLD: NORMAL — SIGNIFICANT CHANGE UP
PLATELET # BLD AUTO: 209 K/UL — SIGNIFICANT CHANGE UP (ref 150–400)
POTASSIUM SERPL-MCNC: 3.9 MMOL/L — SIGNIFICANT CHANGE UP (ref 3.5–5.3)
POTASSIUM SERPL-SCNC: 3.9 MMOL/L — SIGNIFICANT CHANGE UP (ref 3.5–5.3)
PROT SERPL-MCNC: 6.8 G/DL — SIGNIFICANT CHANGE UP (ref 6–8.3)
PROT UR-MCNC: 30 MG/DL
PROTHROM AB SERPL-ACNC: 12.3 SEC — SIGNIFICANT CHANGE UP (ref 10–12.9)
RBC # BLD: 4.43 M/UL — SIGNIFICANT CHANGE UP (ref 3.8–5.2)
RBC # FLD: 12 % — SIGNIFICANT CHANGE UP (ref 10.3–14.5)
RBC BLD AUTO: ABNORMAL
RBC CASTS # UR COMP ASSIST: ABNORMAL /HPF
SODIUM SERPL-SCNC: 134 MMOL/L — LOW (ref 135–145)
SP GR SPEC: >=1.03 — SIGNIFICANT CHANGE UP (ref 1–1.03)
URATE CRY FLD QL MICRO: ABNORMAL /HPF
UROBILINOGEN FLD QL: 0.2 E.U./DL — SIGNIFICANT CHANGE UP
WBC # BLD: 19.66 K/UL — HIGH (ref 3.8–10.5)
WBC # FLD AUTO: 19.66 K/UL — HIGH (ref 3.8–10.5)
WBC UR QL: < 5 /HPF — SIGNIFICANT CHANGE UP

## 2019-10-31 PROCEDURE — 99285 EMERGENCY DEPT VISIT HI MDM: CPT

## 2019-10-31 RX ORDER — CEFTRIAXONE 500 MG/1
1000 INJECTION, POWDER, FOR SOLUTION INTRAMUSCULAR; INTRAVENOUS ONCE
Refills: 0 | Status: COMPLETED | OUTPATIENT
Start: 2019-10-31 | End: 2019-10-31

## 2019-10-31 RX ORDER — ONDANSETRON 8 MG/1
4 TABLET, FILM COATED ORAL EVERY 6 HOURS
Refills: 0 | Status: DISCONTINUED | OUTPATIENT
Start: 2019-10-31 | End: 2019-11-01

## 2019-10-31 RX ORDER — CEFTRIAXONE 500 MG/1
1000 INJECTION, POWDER, FOR SOLUTION INTRAMUSCULAR; INTRAVENOUS EVERY 24 HOURS
Refills: 0 | Status: DISCONTINUED | OUTPATIENT
Start: 2019-11-01 | End: 2019-11-01

## 2019-10-31 RX ORDER — ACETAMINOPHEN 500 MG
1000 TABLET ORAL ONCE
Refills: 0 | Status: DISCONTINUED | OUTPATIENT
Start: 2019-10-31 | End: 2019-11-01

## 2019-10-31 RX ORDER — SODIUM CHLORIDE 9 MG/ML
1000 INJECTION INTRAMUSCULAR; INTRAVENOUS; SUBCUTANEOUS ONCE
Refills: 0 | Status: COMPLETED | OUTPATIENT
Start: 2019-10-31 | End: 2019-10-31

## 2019-10-31 RX ORDER — ONDANSETRON 8 MG/1
4 TABLET, FILM COATED ORAL ONCE
Refills: 0 | Status: COMPLETED | OUTPATIENT
Start: 2019-10-31 | End: 2019-10-31

## 2019-10-31 RX ORDER — KETOROLAC TROMETHAMINE 30 MG/ML
15 SYRINGE (ML) INJECTION EVERY 6 HOURS
Refills: 0 | Status: DISCONTINUED | OUTPATIENT
Start: 2019-10-31 | End: 2019-10-31

## 2019-10-31 RX ORDER — SODIUM CHLORIDE 9 MG/ML
1000 INJECTION, SOLUTION INTRAVENOUS
Refills: 0 | Status: DISCONTINUED | OUTPATIENT
Start: 2019-10-31 | End: 2019-11-01

## 2019-10-31 RX ADMIN — ONDANSETRON 4 MILLIGRAM(S): 8 TABLET, FILM COATED ORAL at 11:54

## 2019-10-31 RX ADMIN — CEFTRIAXONE 100 MILLIGRAM(S): 500 INJECTION, POWDER, FOR SOLUTION INTRAMUSCULAR; INTRAVENOUS at 11:54

## 2019-10-31 RX ADMIN — SODIUM CHLORIDE 1000 MILLILITER(S): 9 INJECTION INTRAMUSCULAR; INTRAVENOUS; SUBCUTANEOUS at 11:54

## 2019-10-31 RX ADMIN — SODIUM CHLORIDE 1000 MILLILITER(S): 9 INJECTION INTRAMUSCULAR; INTRAVENOUS; SUBCUTANEOUS at 14:30

## 2019-10-31 NOTE — ED PROVIDER NOTE - OBJECTIVE STATEMENT
53 yo female with h/o Kidney stones, breast Ca- s/p bilat mastectomy, thyroid nodule- s/p partial thyroidectomy, recently diagnosed with 5 mm stone at right IVJ,  presented to ER c/o generalized fatigue, nausea, decreased appetite, some chills for the past few days. Pt mentioned that she had fever this morning at home and was recommended by her urologist to come to ER for evaluation. Pt denies vomiting, denies dysuria, hematuria, urinary frequency or urgency, also mentioned that her pain has been controlled with Ibuprofen.

## 2019-10-31 NOTE — H&P ADULT - ATTENDING COMMENTS
Patient seen and examined,.  She had been admitted to [facility] earlier this week with a 5 mm right UVJ stone.  After a night of observation she wished to attempt MET.  Her WBC had normalized to 11.  This morning, she called the office to state that she had a temperature of 100.4 and has had persistent nausea and fatigue since leaving the hospital.  Her pain has been relatively controlled on NSAIDs.      Her WBC here is close to 20.  Given her low grade temperature at home, elevated WBC on blood work and persistent nausea I recommended that we  proceed with ureteral stenting.  I explained that in the setting of likely infection it would not be advisable to perform ureteroscopy to eliminate her stone.  We will bring her back in 2 weeks after treating her infection at which time we will definitively treat her stone.

## 2019-10-31 NOTE — ED PROVIDER NOTE - ATTENDING CONTRIBUTION TO CARE
54F pmh h/o Kidney stones, breast Ca- s/p bilat mastectomy, thyroid nodule- s/p partial thyroidectomy, recently diagnosed with 5 mm stone at right UVJ returnning ot ED due to f/c, fatigue, feeling generally unwell. Nontoxic appearing, not afebrile. Feeling improved after ED tx. Seen by urology team, recs admit for likely OR and stent placement & tx.

## 2019-10-31 NOTE — H&P ADULT - NSHPLABSRESULTS_GEN_ALL_CORE
LABS:                        13.9   19.66 )-----------( 209      ( 31 Oct 2019 11:05 )             40.5     10-31    134<L>  |  95<L>  |  15  ----------------------------<  99  3.9   |  28  |  0.84    Ca    9.3      31 Oct 2019 11:05    TPro  6.8  /  Alb  4.1  /  TBili  0.5  /  DBili  x   /  AST  18  /  ALT  29  /  AlkPhos  71  10-31    PT/INR - ( 31 Oct 2019 11:05 )   PT: 12.3 sec;   INR: 1.09          PTT - ( 31 Oct 2019 11:05 )  PTT:29.0 sec  Urinalysis Basic - ( 31 Oct 2019 11:25 )    Color: Yellow / Appearance: Clear / SG: >=1.030 / pH: x  Gluc: x / Ketone: >=80 mg/dL  / Bili: Small / Urobili: 0.2 E.U./dL   Blood: x / Protein: 30 mg/dL / Nitrite: NEGATIVE   Leuk Esterase: NEGATIVE / RBC: 5-10 /HPF / WBC < 5 /HPF   Sq Epi: x / Non Sq Epi: 0-5 /HPF / Bacteria: Present /HPF      CAPILLARY BLOOD GLUCOSE        LIVER FUNCTIONS - ( 31 Oct 2019 11:05 )  Alb: 4.1 g/dL / Pro: 6.8 g/dL / ALK PHOS: 71 U/L / ALT: 29 U/L / AST: 18 U/L / GGT: x             RADIOLOGY & ADDITIONAL STUDIES:

## 2019-10-31 NOTE — H&P ADULT - HISTORY OF PRESENT ILLNESS
55 yo female with h/o Kidney stones, breast Ca- s/p bilat mastectomy, thyroid nodule- s/p partial thyroidectomy, recently diagnosed with 5 mm stone at right UVJ,  presented to ER c/o generalized fatigue, nausea, decreased appetite, some chills for the past few days. Also with some nausea, no emesis Pt mentioned that she had fever this morning at home and was recommended by her urologist to come to ER for evaluation. Pt denies vomiting, denies dysuria, hematuria, urinary frequency or urgency, also mentioned that her pain has been controlled with ibuprofen    <<<Urology note>>>  Above noted. Patient sent to ED after speaking with Dr. Ybarra. Recently discharged from [facility] under urology service with R UVJ stone. Patient wished to go home and attempt to pass the stone on her own. Endorses having fever at home as well as chills for the past couple of days. Pain has been well controlled at home with ibuprofen and patient is currently not in pain. No CP, SOB, dizziness, lightheadedness, dysuria, hematuria, frequency or urgency.     PMH: Kidney stones, breast Ca- s/p bilat mastectomy, thyroid nodule- s/p partial thyroidectomy  PSH: b/l mastectomy 53 yo female with h/o Kidney stones, breast Ca- s/p bilat mastectomy, thyroid nodule- s/p partial thyroidectomy, recently diagnosed with 5 mm stone at right UVJ,  presented to ER c/o generalized fatigue, nausea, decreased appetite, some chills for the past few days. Also with some nausea, no emesis Pt mentioned that she had fever this morning at home and was recommended by her urologist to come to ER for evaluation. Pt denies vomiting, denies dysuria, hematuria, urinary frequency or urgency, also mentioned that her pain has been controlled with ibuprofen    <<<Urology note>>>  Above noted. Patient sent to ED after speaking with Dr. Ybarra. Recently discharged from [facility] under urology service with R UVJ stone. Patient wished to go home and attempt to pass the stone on her own. Endorses having fever at home as well as chills and nausea for the past couple of days. Pain has been well controlled at home with ibuprofen and patient is currently not in pain. No CP, SOB, dizziness, lightheadedness, dysuria, hematuria, frequency or urgency.     PMH: Kidney stones, breast Ca- s/p bilat mastectomy, thyroid nodule- s/p partial thyroidectomy  PSH: b/l mastectomy

## 2019-10-31 NOTE — ED ADULT NURSE NOTE - OTHER COMPLAINTS
pt co R lower abd pain. seen here sunday, dx kidney stone. denies urinary symptoms. scheduled for procedure with md millard today.

## 2019-10-31 NOTE — H&P ADULT - ASSESSMENT
53 yo female, recently d/c'd from urology service with 5mm R UVJ stone and mild R hydro. Presents now with fevers at home and found to have elevated WBC to 19. Currently afebrile and HDS. Cr wnl. Added on for OR with Dr. Ybarra for cysto and stent.     -admit to urology  -NPO/IVF  -pain/nausea control  -pre-op labs  -plan discussed with Dr. Ybarra

## 2019-10-31 NOTE — H&P ADULT - NSHPPHYSICALEXAM_GEN_ALL_CORE
Vital Signs Last 24 Hrs  T(C): 37.5 (31 Oct 2019 11:12), Max: 37.5 (31 Oct 2019 11:12)  T(F): 99.5 (31 Oct 2019 11:12), Max: 99.5 (31 Oct 2019 11:12)  HR: 92 (31 Oct 2019 11:12) (87 - 92)  BP: 113/75 (31 Oct 2019 11:12) (113/71 - 113/75)  BP(mean): --  RR: 16 (31 Oct 2019 11:12) (16 - 16)  SpO2: 99% (31 Oct 2019 11:12) (98% - 99%)    Physical Exam:  General: NAD  Pulmonary: Nonlabored breathing, no respiratory distress  Abdominal: soft, nondistended, nontender with no rebound or guarding. No CVA tenderness  : no suprapubic tenderness, fullness, no hematuria, no CVA tenderness  Extremities: WWP, normal strength, no clubbing/cyanosis/edema  Neuro: A/O x3

## 2019-10-31 NOTE — ED PROVIDER NOTE - CLINICAL SUMMARY MEDICAL DECISION MAKING FREE TEXT BOX
53 yo female with h/o Kidney stones, breast Ca- s/p bilat mastectomy, thyroid nodule- s/p partial thyroidectomy, recently diagnosed with 5 mm stone at right UVJ,   Pt came back to ER c/o generalized malaise, fatigue, fever and chills. Afebrile and non-toxic appearing in the ER. Seen and evaluated by urology team on call. labs including cultures sent. Pt recommended to be admitted for further management, most likely OR and sten placement.

## 2019-10-31 NOTE — ED ADULT NURSE NOTE - OBJECTIVE STATEMENT
54y F, A&ox3, presents to ed c/o right lower abdominal/ flank pain. Reports recently dx with kidney stone and was urged to come in if developed fever. reports 100 fever at home today, was told to come in for removal of kidney stone. PT also reports some nausea. Pt reports painful urination. no diarrhea. +cva tenderness. Will continue to monitor.

## 2019-11-01 ENCOUNTER — TRANSCRIPTION ENCOUNTER (OUTPATIENT)
Age: 54
End: 2019-11-01

## 2019-11-01 VITALS
RESPIRATION RATE: 17 BRPM | SYSTOLIC BLOOD PRESSURE: 95 MMHG | TEMPERATURE: 98 F | DIASTOLIC BLOOD PRESSURE: 50 MMHG | OXYGEN SATURATION: 98 % | HEART RATE: 73 BPM

## 2019-11-01 LAB
ANION GAP SERPL CALC-SCNC: 10 MMOL/L — SIGNIFICANT CHANGE UP (ref 5–17)
BUN SERPL-MCNC: 14 MG/DL — SIGNIFICANT CHANGE UP (ref 7–23)
CALCIUM SERPL-MCNC: 8.8 MG/DL — SIGNIFICANT CHANGE UP (ref 8.4–10.5)
CHLORIDE SERPL-SCNC: 102 MMOL/L — SIGNIFICANT CHANGE UP (ref 96–108)
CO2 SERPL-SCNC: 27 MMOL/L — SIGNIFICANT CHANGE UP (ref 22–31)
CREAT SERPL-MCNC: 0.56 MG/DL — SIGNIFICANT CHANGE UP (ref 0.5–1.3)
CULTURE RESULTS: SIGNIFICANT CHANGE UP
GLUCOSE SERPL-MCNC: 111 MG/DL — HIGH (ref 70–99)
HCT VFR BLD CALC: 36.1 % — SIGNIFICANT CHANGE UP (ref 34.5–45)
HGB BLD-MCNC: 12.1 G/DL — SIGNIFICANT CHANGE UP (ref 11.5–15.5)
MAGNESIUM SERPL-MCNC: 1.8 MG/DL — SIGNIFICANT CHANGE UP (ref 1.6–2.6)
MCHC RBC-ENTMCNC: 30.7 PG — SIGNIFICANT CHANGE UP (ref 27–34)
MCHC RBC-ENTMCNC: 33.5 GM/DL — SIGNIFICANT CHANGE UP (ref 32–36)
MCV RBC AUTO: 91.6 FL — SIGNIFICANT CHANGE UP (ref 80–100)
NRBC # BLD: 0 /100 WBCS — SIGNIFICANT CHANGE UP (ref 0–0)
PHOSPHATE SERPL-MCNC: 3.3 MG/DL — SIGNIFICANT CHANGE UP (ref 2.5–4.5)
PLATELET # BLD AUTO: 179 K/UL — SIGNIFICANT CHANGE UP (ref 150–400)
POTASSIUM SERPL-MCNC: 3.9 MMOL/L — SIGNIFICANT CHANGE UP (ref 3.5–5.3)
POTASSIUM SERPL-SCNC: 3.9 MMOL/L — SIGNIFICANT CHANGE UP (ref 3.5–5.3)
RBC # BLD: 3.94 M/UL — SIGNIFICANT CHANGE UP (ref 3.8–5.2)
RBC # FLD: 12.1 % — SIGNIFICANT CHANGE UP (ref 10.3–14.5)
SODIUM SERPL-SCNC: 139 MMOL/L — SIGNIFICANT CHANGE UP (ref 135–145)
SPECIMEN SOURCE: SIGNIFICANT CHANGE UP
WBC # BLD: 13.86 K/UL — HIGH (ref 3.8–10.5)
WBC # FLD AUTO: 13.86 K/UL — HIGH (ref 3.8–10.5)

## 2019-11-01 RX ORDER — CEFPODOXIME PROXETIL 100 MG
1 TABLET ORAL
Qty: 14 | Refills: 0
Start: 2019-11-01 | End: 2019-11-07

## 2019-11-01 RX ORDER — OXYBUTYNIN CHLORIDE 5 MG
1 TABLET ORAL
Qty: 5 | Refills: 0
Start: 2019-11-01 | End: 2019-11-05

## 2019-11-01 RX ADMIN — CEFTRIAXONE 100 MILLIGRAM(S): 500 INJECTION, POWDER, FOR SOLUTION INTRAMUSCULAR; INTRAVENOUS at 11:53

## 2019-11-01 NOTE — DISCHARGE NOTE PROVIDER - NSDCMRMEDTOKEN_GEN_ALL_CORE_FT
Percocet 5/325 oral tablet: 1 tab(s) orally 2 times a day, As Needed -for moderate pain MDD:4 cefpodoxime 100 mg oral tablet: 1 tab(s) orally every 12 hours   Ditropan XL 5 mg/24 hours oral tablet, extended release: 1 tab(s) orally once a day, As Needed for stent discomfort

## 2019-11-01 NOTE — DISCHARGE NOTE NURSING/CASE MANAGEMENT/SOCIAL WORK - PATIENT PORTAL LINK FT
You can access the FollowMyHealth Patient Portal offered by [facility] by registering at the following website: [email or web address]. By joining [employer]’s FollowMyHealth portal, you will also be able to view your health information using other applications (apps) compatible with our system.

## 2019-11-01 NOTE — DISCHARGE NOTE PROVIDER - NSDCCPCAREPLAN_GEN_ALL_CORE_FT
PRINCIPAL DISCHARGE DIAGNOSIS  Diagnosis: Urolithiasis  Assessment and Plan of Treatment:       SECONDARY DISCHARGE DIAGNOSES  Diagnosis: Pyelonephritis  Assessment and Plan of Treatment:     Diagnosis: Urolithiasis  Assessment and Plan of Treatment: pyelonep

## 2019-11-01 NOTE — DISCHARGE NOTE PROVIDER - HOSPITAL COURSE
54F with right UVJ stone s/p right ureteral stent placement. She presented to the ER due to her low grade temperature at home, elevated WBC on blood work and persistent nausea the decision was made to place a stent. A urine culture was sent from the OR . She passed her TOV. She tolerated procedure well, VSS, afebrile, ambulatory and hemodynamically stable.

## 2019-11-01 NOTE — DISCHARGE NOTE PROVIDER - NSDCFUADDINST_GEN_ALL_CORE_FT
Keep dressing clean and Dry, can remove 24hrs after discharge. No strenuous activity until you speak with your Surgeon. Call  with fever >101, questions and to set up your follow up appointment    Make sure you hydrate well, enough that your urine is clear.     you have a ureteral stent in place. It is common to feel a slight amount of flank fullness, urgency to void, frequency or hematuria as a result of the stent. Keep dressing clean and Dry, can remove 24hrs after discharge. No strenuous activity until you speak with your Surgeon. Call  with fever >101, questions and to set up your follow up appointment    If any fever > 101, chills or severe pain come to the ER    Make sure you hydrate well, enough that your urine is clear.     you have a ureteral stent in place. It is common to feel a slight amount of flank fullness, urgency to void, frequency or hematuria as a result of the stent. No strenuous activity until you speak with your Surgeon. Call  with fever >101, questions and to set up your follow up appointment    If any fever > 101, chills or severe pain come to the ER    Make sure you hydrate well, enough that your urine is clear.     you have a ureteral stent in place. It is common to feel a slight amount of flank fullness, urgency to void, frequency or hematuria as a result of the stent.

## 2019-11-01 NOTE — PROGRESS NOTE ADULT - SUBJECTIVE AND OBJECTIVE BOX
Urology Post-Op Note     Pre-Op Dx: R UVJ stone  Procedure: Insertion, ureteral stent    Surgeon: Amada    Subjective: Patient resting comfortably in bed with no complaints. No CP, SOB, dizziness, lightheadedness. Pain well controlled.     Vital Signs Last 24 Hrs  T(C): 37.2 (31 Oct 2019 16:20), Max: 37.9 (31 Oct 2019 13:24)  T(F): 98.9 (31 Oct 2019 16:20), Max: 100.2 (31 Oct 2019 13:24)  HR: 72 (31 Oct 2019 17:20) (68 - 92)  BP: 108/56 (31 Oct 2019 17:20) (97/53 - 120/74)  BP(mean): 75 (31 Oct 2019 17:20) (75 - 80)  RR: 36 (31 Oct 2019 17:20) (16 - 36)  SpO2: 96% (31 Oct 2019 17:20) (95% - 99%)    Physical Exam:  General: NAD, resting comfortably in bed  Pulmonary: Nonlabored breathing, no respiratory distress  Abdominal: soft, nondistended, nontender with no rebound or guarding  : geronimo in place with clear yellow urine   Extremities: WWP  Neuro: A/O x 3      LABS:                        13.9   19.66 )-----------( 209      ( 31 Oct 2019 11:05 )             40.5     10-31    134<L>  |  95<L>  |  15  ----------------------------<  99  3.9   |  28  |  0.84    Ca    9.3      31 Oct 2019 11:05    TPro  6.8  /  Alb  4.1  /  TBili  0.5  /  DBili  x   /  AST  18  /  ALT  29  /  AlkPhos  71  10-31    PT/INR - ( 31 Oct 2019 11:05 )   PT: 12.3 sec;   INR: 1.09          PTT - ( 31 Oct 2019 11:05 )  PTT:29.0 sec  CAPILLARY BLOOD GLUCOSE        Urinalysis Basic - ( 31 Oct 2019 11:25 )    Color: Yellow / Appearance: Clear / SG: >=1.030 / pH: x  Gluc: x / Ketone: >=80 mg/dL  / Bili: Small / Urobili: 0.2 E.U./dL   Blood: x / Protein: 30 mg/dL / Nitrite: NEGATIVE   Leuk Esterase: NEGATIVE / RBC: 5-10 /HPF / WBC < 5 /HPF   Sq Epi: x / Non Sq Epi: 0-5 /HPF / Bacteria: Present /HPF      LIVER FUNCTIONS - ( 31 Oct 2019 11:05 )  Alb: 4.1 g/dL / Pro: 6.8 g/dL / ALK PHOS: 71 U/L / ALT: 29 U/L / AST: 18 U/L / GGT: x           ABO Interpretation: A (10-31 @ 11:04)        Radiology and Additional Studies:    Assessment:54y Female s/p Insertion, ureteral stent      Plan:  Pain/nausea control PRN  Home meds  Incentive spirometer/OOB/Ambulate  IVF, Diet: regular  AM labs  Liliya
INTERVAL HPI/OVERNIGHT EVENTS:  No acute events overnight.    VITALS:    T(F): 98.5 (11-01-19 @ 01:03), Max: 100.2 (10-31-19 @ 13:24)  HR: 68 (11-01-19 @ 01:03) (68 - 92)  BP: 99/61 (11-01-19 @ 01:03) (95/52 - 120/74)  RR: 17 (11-01-19 @ 01:03) (16 - 36)  SpO2: 97% (11-01-19 @ 01:03) (95% - 99%)  Wt(kg): --    I&O's Detail    31 Oct 2019 07:01  -  01 Nov 2019 05:25  --------------------------------------------------------  IN:    lactated ringers.: 300 mL    Oral Fluid: 350 mL  Total IN: 650 mL    OUT:    Indwelling Catheter - Urethral: 1500 mL  Total OUT: 1500 mL    Total NET: -850 mL          MEDICATIONS:    ANTIBIOTICS:  cefTRIAXone   IVPB 1000 milliGRAM(s) IV Intermittent every 24 hours      PAIN CONTROL:  acetaminophen  IVPB .. 1000 milliGRAM(s) IV Intermittent once PRN  ondansetron Injectable 4 milliGRAM(s) IV Push every 6 hours PRN       MEDS:      HEME/ONC        PHYSICAL EXAM:  General: No acute distress.  Alert and Oriented  Abdominal Exam: soft nt/nd. Negative CVAT B/L.   Exam: Lovell cath in place, draining yellow/clear output.       LABS:                        13.9   19.66 )-----------( 209      ( 31 Oct 2019 11:05 )             40.5     10-31    134<L>  |  95<L>  |  15  ----------------------------<  99  3.9   |  28  |  0.84    Ca    9.3      31 Oct 2019 11:05    TPro  6.8  /  Alb  4.1  /  TBili  0.5  /  DBili  x   /  AST  18  /  ALT  29  /  AlkPhos  71  10-31    PT/INR - ( 31 Oct 2019 11:05 )   PT: 12.3 sec;   INR: 1.09          PTT - ( 31 Oct 2019 11:05 )  PTT:29.0 sec  Urinalysis Basic - ( 31 Oct 2019 11:25 )    Color: Yellow / Appearance: Clear / SG: >=1.030 / pH: x  Gluc: x / Ketone: >=80 mg/dL  / Bili: Small / Urobili: 0.2 E.U./dL   Blood: x / Protein: 30 mg/dL / Nitrite: NEGATIVE   Leuk Esterase: NEGATIVE / RBC: 5-10 /HPF / WBC < 5 /HPF   Sq Epi: x / Non Sq Epi: 0-5 /HPF / Bacteria: Present /HPF        RADIOLOGY & ADDITIONAL TESTS:    ASSESSMENT: 54yFemale w/ right UVJ stone s/p Cystoscopy and right ureteral stent placement.     PLAN:  Diet: Reg  Pain control  Monitor Urine Output  DVT ppx   Cont Abx: Ceftriaxone   OOB/IS

## 2019-11-01 NOTE — DISCHARGE NOTE PROVIDER - CARE PROVIDER_API CALL
Robbie Ybarra)  Urology  [address] [address]  Phone: [phone]  Fax: [phone]  Follow Up Time:

## 2019-11-03 LAB
CULTURE RESULTS: NO GROWTH — SIGNIFICANT CHANGE UP
SPECIMEN SOURCE: SIGNIFICANT CHANGE UP

## 2019-11-04 DIAGNOSIS — N20.0 CALCULUS OF KIDNEY: ICD-10-CM

## 2019-11-04 DIAGNOSIS — N13.1 HYDRONEPHROSIS WITH URETERAL STRICTURE, NOT ELSEWHERE CLASSIFIED: ICD-10-CM

## 2019-11-04 DIAGNOSIS — Z85.3 PERSONAL HISTORY OF MALIGNANT NEOPLASM OF BREAST: ICD-10-CM

## 2019-11-05 DIAGNOSIS — Z85.3 PERSONAL HISTORY OF MALIGNANT NEOPLASM OF BREAST: ICD-10-CM

## 2019-11-05 DIAGNOSIS — N12 TUBULO-INTERSTITIAL NEPHRITIS, NOT SPECIFIED AS ACUTE OR CHRONIC: ICD-10-CM

## 2019-11-05 DIAGNOSIS — E04.1 NONTOXIC SINGLE THYROID NODULE: ICD-10-CM

## 2019-11-05 DIAGNOSIS — N13.6 PYONEPHROSIS: ICD-10-CM

## 2019-11-05 LAB
CULTURE RESULTS: SIGNIFICANT CHANGE UP
CULTURE RESULTS: SIGNIFICANT CHANGE UP
SPECIMEN SOURCE: SIGNIFICANT CHANGE UP
SPECIMEN SOURCE: SIGNIFICANT CHANGE UP

## 2019-11-08 PROCEDURE — 87040 BLOOD CULTURE FOR BACTERIA: CPT

## 2019-11-08 PROCEDURE — C2617: CPT

## 2019-11-08 PROCEDURE — C1769: CPT

## 2019-11-08 PROCEDURE — 80053 COMPREHEN METABOLIC PANEL: CPT

## 2019-11-08 PROCEDURE — 85730 THROMBOPLASTIN TIME PARTIAL: CPT

## 2019-11-08 PROCEDURE — 99285 EMERGENCY DEPT VISIT HI MDM: CPT | Mod: 25

## 2019-11-08 PROCEDURE — 36415 COLL VENOUS BLD VENIPUNCTURE: CPT

## 2019-11-08 PROCEDURE — 85027 COMPLETE CBC AUTOMATED: CPT

## 2019-11-08 PROCEDURE — 86850 RBC ANTIBODY SCREEN: CPT

## 2019-11-08 PROCEDURE — 86900 BLOOD TYPING SEROLOGIC ABO: CPT

## 2019-11-08 PROCEDURE — 85610 PROTHROMBIN TIME: CPT

## 2019-11-08 PROCEDURE — 81001 URINALYSIS AUTO W/SCOPE: CPT

## 2019-11-08 PROCEDURE — 87086 URINE CULTURE/COLONY COUNT: CPT

## 2019-11-08 PROCEDURE — 86901 BLOOD TYPING SEROLOGIC RH(D): CPT

## 2019-11-08 PROCEDURE — 80048 BASIC METABOLIC PNL TOTAL CA: CPT

## 2019-11-08 PROCEDURE — 84100 ASSAY OF PHOSPHORUS: CPT

## 2019-11-08 PROCEDURE — 96374 THER/PROPH/DIAG INJ IV PUSH: CPT

## 2019-11-08 PROCEDURE — 81025 URINE PREGNANCY TEST: CPT

## 2019-11-08 PROCEDURE — 83735 ASSAY OF MAGNESIUM: CPT

## 2019-11-08 PROCEDURE — 85025 COMPLETE CBC W/AUTO DIFF WBC: CPT

## 2019-11-08 PROCEDURE — C1758: CPT

## 2019-11-08 PROCEDURE — 76000 FLUOROSCOPY <1 HR PHYS/QHP: CPT

## 2019-11-08 PROCEDURE — 96375 TX/PRO/DX INJ NEW DRUG ADDON: CPT

## 2019-11-15 ENCOUNTER — RESULT REVIEW (OUTPATIENT)
Age: 54
End: 2019-11-15

## 2019-11-15 ENCOUNTER — OUTPATIENT (OUTPATIENT)
Dept: OUTPATIENT SERVICES | Facility: HOSPITAL | Age: 54
LOS: 1 days | Discharge: HOME | End: 2019-11-15

## 2019-11-15 DIAGNOSIS — Z90.09 ACQUIRED ABSENCE OF OTHER PART OF HEAD AND NECK: Chronic | ICD-10-CM

## 2019-11-15 DIAGNOSIS — Z90.13 ACQUIRED ABSENCE OF BILATERAL BREASTS AND NIPPLES: Chronic | ICD-10-CM

## 2019-11-15 RX ORDER — PHENAZOPYRIDINE HCL 100 MG
1 TABLET ORAL
Qty: 9 | Refills: 0
Start: 2019-11-15 | End: 2019-11-17

## 2019-11-20 LAB — SURGICAL PATHOLOGY STUDY: SIGNIFICANT CHANGE UP

## 2019-11-25 LAB — NIDUS STONE QN: SIGNIFICANT CHANGE UP

## 2020-06-25 NOTE — DISCHARGE NOTE PROVIDER - NS AS DC PROVIDER CONTACT Y/N MULTI
[facility]     Emergency Department     Faculty Attestation        I performed a history and physical examination of the patient and discussed management with the resident. I reviewed the residents note and agree with the documented findings and plan of care. Any areas of disagreement are noted on the chart. I was personally present for the key portions of any procedures. I have documented in the chart those procedures where I was not present during the key portions. I have reviewed the emergency nurses triage note. I agree with the chief complaint, past medical history, past surgical history, allergies, medications, social and family history as documented unless otherwise noted below. For mid-level providers such as nurse practitioners as well as physicians assistants:    I have personally seen and evaluated the patient. I find the patient's history and physical exam are consistent with NP/PA documentation. I agree with the care provided, treatment rendered, disposition, & follow-up plan. Additional findings are as noted.     Vital Signs: BP (!) 148/80   Pulse 88   Temp 98.4 °F (36.9 °C)   Resp 12   Ht 5' 4\" (1.626 m)   Wt 174 lb (78.9 kg)   SpO2 98%   BMI 29.87 kg/m²   PCP:  Hamzah Levi, APRN - CNP    Pertinent Comments:         Critical Care  None          Ata Malik MD  Attending Emergency Medicine Physician              Chencho Chaves MD  06/25/20 [id number] Yes

## 2020-08-01 ENCOUNTER — OUTPATIENT (OUTPATIENT)
Dept: OUTPATIENT SERVICES | Facility: HOSPITAL | Age: 55
LOS: 1 days | End: 2020-08-01
Payer: COMMERCIAL

## 2020-08-01 ENCOUNTER — APPOINTMENT (OUTPATIENT)
Dept: ULTRASOUND IMAGING | Facility: HOSPITAL | Age: 55
End: 2020-08-01
Payer: COMMERCIAL

## 2020-08-01 ENCOUNTER — RESULT REVIEW (OUTPATIENT)
Age: 55
End: 2020-08-01

## 2020-08-01 DIAGNOSIS — Z90.13 ACQUIRED ABSENCE OF BILATERAL BREASTS AND NIPPLES: Chronic | ICD-10-CM

## 2020-08-01 DIAGNOSIS — Z90.09 ACQUIRED ABSENCE OF OTHER PART OF HEAD AND NECK: Chronic | ICD-10-CM

## 2020-08-01 PROCEDURE — 76536 US EXAM OF HEAD AND NECK: CPT | Mod: 26

## 2020-08-01 PROCEDURE — 76536 US EXAM OF HEAD AND NECK: CPT

## 2020-10-05 ENCOUNTER — APPOINTMENT (OUTPATIENT)
Dept: OTOLARYNGOLOGY | Facility: CLINIC | Age: 55
End: 2020-10-05
Payer: COMMERCIAL

## 2020-10-05 VITALS
DIASTOLIC BLOOD PRESSURE: 65 MMHG | TEMPERATURE: 98.3 F | OXYGEN SATURATION: 97 % | SYSTOLIC BLOOD PRESSURE: 99 MMHG | HEART RATE: 96 BPM

## 2020-10-05 DIAGNOSIS — H61.22 IMPACTED CERUMEN, LEFT EAR: ICD-10-CM

## 2020-10-05 PROCEDURE — 69210 REMOVE IMPACTED EAR WAX UNI: CPT

## 2020-10-05 PROCEDURE — 99214 OFFICE O/P EST MOD 30 MIN: CPT | Mod: 25

## 2020-10-05 PROCEDURE — 31575 DIAGNOSTIC LARYNGOSCOPY: CPT

## 2020-10-05 NOTE — END OF VISIT
[Time Spent: ___ minutes] : I have spent [unfilled] minutes of time on the encounter. [>50% of the face to face encounter time was spent on counseling and/or coordination of care for ___] : Greater than 50% of the face to face encounter time was spent on counseling and/or coordination of care for [unfilled] normal...

## 2020-10-05 NOTE — ASU PREOP CHECKLIST - BSA (M2)
"  VS: Stable   O2: Room air saturations 98%.    Output: Up to bathroom to void. Did not measure or observed. Pt report \" No problems with urination.\"    Last BM: Yesterday 10/4/20 reports patient   Activity: Up with crutches and SBA. Patients wife appears to be quite confident in transfers to and from bathroom with . PT issued crutches to patient prior to discharging to home.    Skin: Pt denies any issues. Did not observe under right leg ace wrap.    Pain: Patient has been taking Oral Dilaudid every 3 hours for pain. Pt had script filled here at the hospital prior to discharge to home.    CMS: Pt states\" I have baseline numbness in that right lower leg.\"    Dressing: Ace  Wrap over right knee incision    Diet: Regular. Tolerated well.    LDA: Discontinued SL prior to going home.    Equipment: Crutches/IS/filled scripts here at the hospital/all patients own belongings and own electronics.    Plan: Patient to be  Discharged  To home with wife and 8 year old son. Taught pt about plan of cares for discharge, med's, diet, hygiene, incision cares, follow up appointments, when to call Dr for questions or concerns.    Additional Info: Patient and patients wife seemed to have a full understanding of discharge instructions. Patient was escorted to front door in wheelchair to be transported by his wife via car. Patient received all his own belongings and filled scripts prior to leaving.        " 1.55

## 2020-10-05 NOTE — PROCEDURE
[Image(s) Captured] : image(s) captured and filed [Unable to Cooperate with Mirror] : patient unable to cooperate with mirror [Gag Reflex] : gag reflex preventing mirror examination [Topical Lidocaine] : topical lidocaine [Oxymetazoline HCl] : oxymetazoline HCl [Serial Number: ___] : Serial Number: [unfilled] [Cerumen Impaction] : Cerumen Impaction [Same] : same as the Pre Op Dx. [] : Removal of Cerumen [de-identified] : The nasal septum is minimally deviated to the left.  There are no masses or polyps and the nasal mucosa and secretions are normal. The choanae and posterior nasopharynx are normal without masses or drainage. The Eustachian tube orifices appear patent. The pharynx, including the posterior and lateral pharyngeal walls, the vallecula and base of tongue are normal without ulcerations, lesions or masses. The hypopharynx including the pyriform sinuses open well without pooling of secretions, mucosal lesions or masses. The supraglottic larynx including the epiglottis, petiole, arytenoids, glossoepiglottic, aryepiglottic and pharyngoepiglottic folds are normal without mucosal lesions, ulcerations or masses. The glottis reveals normal false vocal folds. The true vocal folds are glistening white, tense and of equal length, without paralysis, having symmetric mobility on adduction and abduction. There are no mucosal lesions, nodules, cysts, erythroplasia or leukoplakia. The posterior cricoid area has healthy pink mucosa in the interarytenoid area and esophageal inlet. There is no thickening/edema of the interarytenoid mucosa suggestive of posterior laryngitis from laryngopharyngeal acid reflux disease. The trachea is clear without narrowing in the immediate subglottic region, without deviation or lesions. \par  [de-identified] : multiple thyroid nodules [FreeTextEntry1] : Cerumen AS [FreeTextEntry6] : copious cerumen removed AS with suction and curettes, TM normal on otoscopy.

## 2020-10-05 NOTE — CONSULT LETTER
[Dear  ___] : Dear  [unfilled], [Consult Letter:] : I had the pleasure of evaluating your patient, [unfilled]. [Please see my note below.] : Please see my note below. [Consult Closing:] : Thank you very much for allowing me to participate in the care of this patient.  If you have any questions, please do not hesitate to contact me. [Sincerely,] : Sincerely, [DrKarla  ___] : Dr. TAVERA [FreeTextEntry3] : \par Philip Milner M.D., FACS, ECNU\par Director Center for Thyroid & Parathyroid Surgery\par [facility] at [facility]\par Certified in Thyroid/Parathyroid/Neck Ultrasound, ECNU/ AIUM\par \par , Department of Otolaryngology\par [facility] [employer] at [facility]\par

## 2020-10-05 NOTE — HISTORY OF PRESENT ILLNESS
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  She had a nodule in the right lobe that measured 3.9 x 1.8 x 2.6 cm and had started thyroid hormone.  She took this for  ~ 20 years but after her last pregnancy she stopped taking it.  She was followed for 8-9 years by Dr. Anand at [facility] and was stable on US.  She then saw Mary Acuna MD her new Endocrinologist and an FNA biopsy was done with the [facility] but not reflexed as the cytology was benign last August. She remains euthyroid but on last ultrasound in August the nodule in the right lobe had increased now to 5.28 cm.  Of interest she had started taking Kelp tablets after treatment for lobular infiltrating breast cancer treated with double mastectomy last April.  She stopped taking the Kelp in August of this year when the nodule was noted to have grown significantly despite relative stable size of the entire gland. The nodule is more visible now and she feels an intermittent sense of pressure with certain head positions but denies SOB at rest, dysphagia, neck pain or recent voice changes. Her mother had a thyroidectomy for a presumed toxic goiter/nodule but there is no family history of thyroid cancer.  She has no known radiation exposures in her youth. SHe now desires excision due to the growth of the nodule and compressive symptoms, referred by her Endocrinologist. \par  [FreeTextEntry1] : Ophelia returns for her follow up visit after a right hemithyroidectomy for a goiter on 11/24/17.   Her surgical path was c/w adenomatous hyperplasia. She had early TFTs that were in the normal range on  10/10/19. Repeat thyroid function studies revealed a TSH of 2.75 and a free T4 of 1.3 on 9/30/2020. Her weight has been stable.  She had a subcentimeter left lobe nodule.  She had a f/u ultrasound on 08/01/2020. this revealed a slightly enlarged left thyroid lobe and 5 CM in sagittal height. The left lobe contains a total of 3 nodules. There is an upper pole 5 x 3 x 5 mm nodule that is stable.had interpolar 4 x 3 x 5 mm nodule has slightly increased in size and a new anterior pillar actual measures 7 x 4 x 7 mm described as partially spongiform. There were no abnormal lymph nodes identified. She denies fever, body aches, cough, cyanosis, chest burning, anosmia or recent known COVID exposures.  All family members at home are well.

## 2021-10-15 ENCOUNTER — APPOINTMENT (OUTPATIENT)
Dept: ULTRASOUND IMAGING | Facility: HOSPITAL | Age: 56
End: 2021-10-15
Payer: COMMERCIAL

## 2021-10-15 ENCOUNTER — OUTPATIENT (OUTPATIENT)
Dept: OUTPATIENT SERVICES | Facility: HOSPITAL | Age: 56
LOS: 1 days | End: 2021-10-15
Payer: COMMERCIAL

## 2021-10-15 DIAGNOSIS — Z90.09 ACQUIRED ABSENCE OF OTHER PART OF HEAD AND NECK: Chronic | ICD-10-CM

## 2021-10-15 DIAGNOSIS — Z90.13 ACQUIRED ABSENCE OF BILATERAL BREASTS AND NIPPLES: Chronic | ICD-10-CM

## 2021-10-15 PROCEDURE — 76536 US EXAM OF HEAD AND NECK: CPT | Mod: 26

## 2021-10-15 PROCEDURE — 76536 US EXAM OF HEAD AND NECK: CPT

## 2021-11-11 ENCOUNTER — TRANSCRIPTION ENCOUNTER (OUTPATIENT)
Age: 56
End: 2021-11-11

## 2021-11-28 ENCOUNTER — NON-APPOINTMENT (OUTPATIENT)
Age: 56
End: 2021-11-28

## 2022-01-03 ENCOUNTER — TRANSCRIPTION ENCOUNTER (OUTPATIENT)
Age: 57
End: 2022-01-03

## 2022-03-14 ENCOUNTER — APPOINTMENT (OUTPATIENT)
Dept: OTOLARYNGOLOGY | Facility: CLINIC | Age: 57
End: 2022-03-14
Payer: COMMERCIAL

## 2022-03-14 VITALS
TEMPERATURE: 98.2 F | HEART RATE: 79 BPM | DIASTOLIC BLOOD PRESSURE: 73 MMHG | OXYGEN SATURATION: 99 % | BODY MASS INDEX: 23.92 KG/M2 | WEIGHT: 130 LBS | SYSTOLIC BLOOD PRESSURE: 114 MMHG | HEIGHT: 62 IN

## 2022-03-14 DIAGNOSIS — E04.2 NONTOXIC MULTINODULAR GOITER: ICD-10-CM

## 2022-03-14 DIAGNOSIS — Z85.3 PERSONAL HISTORY OF MALIGNANT NEOPLASM OF BREAST: ICD-10-CM

## 2022-03-14 DIAGNOSIS — R89.7 ABNORMAL HISTOLOGICAL FINDINGS IN SPECIMENS FROM OTHER ORGANS, SYSTEMS AND TISSUES: ICD-10-CM

## 2022-03-14 PROCEDURE — 99215 OFFICE O/P EST HI 40 MIN: CPT | Mod: 25

## 2022-03-14 PROCEDURE — 31575 DIAGNOSTIC LARYNGOSCOPY: CPT

## 2022-03-14 PROCEDURE — 10005 FNA BX W/US GDN 1ST LES: CPT

## 2022-03-14 NOTE — REASON FOR VISIT
[FreeTextEntry2] : a followup visit after partial thyroidectomy and interval growth of a solid left mid lobe nodule to 1.7 cm. .  [FreeTextEntry1] : Referred by Mary Acuna MD Endocrinologist, PCP is Teo Saeed MD

## 2022-03-14 NOTE — CONSULT LETTER
[Dear  ___] : Dear  [unfilled], [Consult Letter:] : I had the pleasure of evaluating your patient, [unfilled]. [Please see my note below.] : Please see my note below. [Consult Closing:] : Thank you very much for allowing me to participate in the care of this patient.  If you have any questions, please do not hesitate to contact me. [Sincerely,] : Sincerely, [DrKarla  ___] : Dr. TAVERA [FreeTextEntry3] : \par Philip Milner M.D., FACS, ECNU\par Director Center for Thyroid & Parathyroid Surgery\par [facility] [place] [facility]\par Certified in Thyroid/Parathyroid/Neck Ultrasound, ECNU/ AIUM\par \par , Department of Otolaryngology\par [facility] [employer] at [facility]\par

## 2022-03-14 NOTE — PROCEDURE
[Image(s) Captured] : image(s) captured and filed [Unable to Cooperate with Mirror] : patient unable to cooperate with mirror [Gag Reflex] : gag reflex preventing mirror examination [Topical Lidocaine] : topical lidocaine [Oxymetazoline HCl] : oxymetazoline HCl [Serial Number: ___] : Serial Number: [unfilled] [Flexible Endoscope] : examined with the flexible endoscope [FreeTextEntry3] : \par ..........................................[facility]\par ...........ULTRASOUND-GUIDED THYROID FINE NEEDLE ASPIRATION BIOPSY REPORT\par \par NAME: BRODY COLLIER.........MR# [id number] .........: 1965 .........DATE: 3/14/2022 .........TIME: 11:17 AM.\par \par HISTORY/ INDICATIONS: 56- year-old female with a left mid lobe solid nodule exhibiting interval growth.\par \par EXAM: Real-time high-resolution ultrasound imaging of the thyroid gland was performed in the longitudinal and transverse planes with color power Doppler supplementation and image capture.\par \par FINDINGS: A left mid lobe anterior lateral nodule measuring 1.74 x 0.42 x 1.14 cm (longitudinal x AP x transverse) was identified and targeted for USG-FNA.  The nodule had the following ultrasonographic characteristics: solid, mildly hypoechoic, heterogeneous, ill-defined margins, no definite punctate echogenic foci, grade 3 vascularity on color Doppler, and wider-than-tall.\par \par PROCEDURE: A time out took place and documented for correct patient identifiers, site and side of procedure.  After obtaining informed consent with discussion of risks, benefits and alternatives, the patient was positioned semi-supine, the skin was prepped with alcohol and 0.5 cc of 1% Lidocaine with 1:100,000 epinephrine was injected for local anesthesia. A #25-gauge needle was then passed along the perpendicular plane of the transducer, positioned within the nodule and confirmed with ultrasonography.  Multiple aspirations were made within the nodule with two separate needle punctures, four passes each.  Aspirates were directly placed into both RNA protect media and and cytolyt solutions for cytologic analysis, immunohistochemistry, and possible molecular genomic diagnostics.  The patient tolerated the procedure well without complications and was discharged with signed post-procedure instructions indicating the importance of following up on all results. \par \par ASSESSMENT & PLAN: Successful USG-FNA of a left solid mid lateral anterior lobe nodule was well tolerated without complications. The patient will be contacted to review the cytologic findings as soon as available for further treatment planning.  A discussion took place with the patient who accepted the responsibility to call the office to review the cytology results if no communication occurs within two weeks. Follow-up imaging in 1 year is recommended if the cytology is reported as benign, [place] Category II.\par \par Electronically signed by referring, interpreting and reporting physician:\julia HERNANDEZ M.D., FACS on 3/14/2022, 11:30 AM.\par \par [facility]: [address], [address],  [place] [address]\par [phone] (voice), [phone] (fax) [de-identified] : The nasal septum is minimally deviated to the left.  There are no masses or polyps and the nasal mucosa and secretions are normal. The choanae and posterior nasopharynx are normal without masses or drainage. The Eustachian tube orifices appear patent. The pharynx, including the posterior and lateral pharyngeal walls, the vallecula and base of tongue are normal without ulcerations, lesions or masses. The hypopharynx including the pyriform sinuses open well without pooling of secretions, mucosal lesions or masses. The supraglottic larynx including the epiglottis, petiole, arytenoids, glossoepiglottic, aryepiglottic and pharyngoepiglottic folds are normal without mucosal lesions, ulcerations or masses. The glottis reveals normal false vocal folds. The true vocal folds are glistening white, tense and of equal length, without paralysis, having symmetric mobility on adduction and abduction. There are no mucosal lesions, nodules, cysts, erythroplasia or leukoplakia. The posterior cricoid area has healthy pink mucosa in the interarytenoid area and esophageal inlet. There is minimal thickening/edema of the interarytenoid mucosa suggestive of posterior laryngitis from laryngopharyngeal acid reflux disease. The trachea is clear without narrowing in the immediate subglottic region, without deviation or lesions. \par  [de-identified] : multiple thyroid nodules and interval growth of a left mid lobe nodule for FNA biopsy.

## 2022-03-14 NOTE — HISTORY OF PRESENT ILLNESS
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  She had a nodule in the right lobe that measured 3.9 x 1.8 x 2.6 cm and had started thyroid hormone.  She took this for  ~ 20 years but after her last pregnancy she stopped taking it.  She was followed for 8-9 years by Dr. Anand at [facility] and was stable on US.  She then saw Mary Acuna MD her new Endocrinologist and an FNA biopsy was done with the [facility] but not reflexed as the cytology was benign last August. She remains euthyroid but on last ultrasound in August the nodule in the right lobe had increased now to 5.28 cm.  Of interest she had started taking Kelp tablets after treatment for lobular infiltrating breast cancer treated with double mastectomy last April.  She stopped taking the Kelp in August of this year when the nodule was noted to have grown significantly despite relative stable size of the entire gland. The nodule is more visible now and she feels an intermittent sense of pressure with certain head positions but denies SOB at rest, dysphagia, neck pain or recent voice changes. Her mother had a thyroidectomy for a presumed toxic goiter/nodule but there is no family history of thyroid cancer.  She has no known radiation exposures in her youth. SHe now desires excision due to the growth of the nodule and compressive symptoms, referred by her Endocrinologist. \par  [FreeTextEntry1] : Ophelia returns for her follow up visit after a right hemithyroidectomy for a goiter on 11/24/17.   Her surgical path was c/w adenomatous hyperplasia. She had early TFTs that were in the normal range on  10/10/19. Repeat thyroid function studies revealed a TSH of 2.75 and a free T4 of 1.3 on 9/30/2020. Her weight has been stable.  She had a subcentimeter left lobe nodule.  She had a f/u ultrasound on 10/15/2021.  This revealed a slightly enlarged left thyroid lobe and 5.1 CM in sagittal height. The left mid lobe demonstrated interval growth of one of her nodules since her prior examination in August 2020 now measuring up to 1.6 cm in greatest dimension and an FNA biopsy was recommended at that time.  Due to the Omicron variant and other issues she did not schedule biopsy until now.  The nodule now has increased in size from of there were no abnormal lymph nodes identified. She denies fever, body aches, cough, cyanosis, chest burning, anosmia or recent known COVID exposures.  All family members at home are well. She has not been vaccinated.

## 2022-04-18 ENCOUNTER — APPOINTMENT (OUTPATIENT)
Dept: OTOLARYNGOLOGY | Facility: CLINIC | Age: 57
End: 2022-04-18

## 2022-06-26 NOTE — ASU PREOP CHECKLIST - IDENTIFICATION BAND VERIFIED
PATIENT RESTING IN BED, NO COMPLAINTS OF PAIN. SOME ANXIETY, READY TO GO HOME.
NO CHANGE IN LUNG SOUNDS, NO BLOODY SPUTUM. NO CONFUSION. IV SITE FLUSHES
WELL. NO PAIN AT SITE. PATIENT INDEPENDENT IN ROOM. done

## 2022-07-25 NOTE — PATIENT PROFILE ADULT - NSTRANSFERBELONGINGSDISPO_GEN_A_NUR
with patient 50M presents to the ED with dental pain and facial swelling. His dentist said that he would see him when the swelling goes down but he should take an antibiotic first. He did not prescribe the antibiotic. He told him to go to the ED.   Exam as stated. Will recc pt f/u with Dentist but pt needs an appt. Note left for  to assist pt with filing an appt for presumed dental abscess. Advised pt tylenol prn pain. No advil (he had recent stents in early 2022). Patient did not want anything stronger.   Worsening, continued or ANY new concerning symptoms return to the emergency department.

## 2022-11-11 ENCOUNTER — APPOINTMENT (OUTPATIENT)
Dept: ULTRASOUND IMAGING | Facility: HOSPITAL | Age: 57
End: 2022-11-11

## 2022-11-11 ENCOUNTER — OUTPATIENT (OUTPATIENT)
Dept: OUTPATIENT SERVICES | Facility: HOSPITAL | Age: 57
LOS: 1 days | End: 2022-11-11
Payer: COMMERCIAL

## 2022-11-11 DIAGNOSIS — Z90.09 ACQUIRED ABSENCE OF OTHER PART OF HEAD AND NECK: Chronic | ICD-10-CM

## 2022-11-11 DIAGNOSIS — Z90.13 ACQUIRED ABSENCE OF BILATERAL BREASTS AND NIPPLES: Chronic | ICD-10-CM

## 2022-11-11 PROCEDURE — 76536 US EXAM OF HEAD AND NECK: CPT

## 2022-11-11 PROCEDURE — 76536 US EXAM OF HEAD AND NECK: CPT | Mod: 26

## 2023-07-17 ENCOUNTER — APPOINTMENT (OUTPATIENT)
Dept: OTOLARYNGOLOGY | Facility: CLINIC | Age: 58
End: 2023-07-17
Payer: COMMERCIAL

## 2023-07-17 VITALS
DIASTOLIC BLOOD PRESSURE: 48 MMHG | TEMPERATURE: 98.3 F | OXYGEN SATURATION: 97 % | SYSTOLIC BLOOD PRESSURE: 94 MMHG | HEIGHT: 62 IN | HEART RATE: 93 BPM

## 2023-07-17 DIAGNOSIS — D44.0 NEOPLASM OF UNCERTAIN BEHAVIOR OF THYROID GLAND: ICD-10-CM

## 2023-07-17 DIAGNOSIS — D34 BENIGN NEOPLASM OF THYROID GLAND: ICD-10-CM

## 2023-07-17 DIAGNOSIS — E89.0 POSTPROCEDURAL HYPOTHYROIDISM: ICD-10-CM

## 2023-07-17 PROCEDURE — 76536 US EXAM OF HEAD AND NECK: CPT

## 2023-07-17 PROCEDURE — 99214 OFFICE O/P EST MOD 30 MIN: CPT | Mod: 25

## 2023-07-17 NOTE — CONSULT LETTER
[Dear  ___] : Dear  [unfilled], [Consult Letter:] : I had the pleasure of evaluating your patient, [unfilled]. [Please see my note below.] : Please see my note below. [Consult Closing:] : Thank you very much for allowing me to participate in the care of this patient.  If you have any questions, please do not hesitate to contact me. [Sincerely,] : Sincerely, [DrKarla  ___] : Dr. TAVERA [FreeTextEntry3] : \par Philip Milner M.D., FACS, ECNU\par Director Center for Thyroid & Parathyroid Surgery\par [facility]\par Certified in Thyroid/Parathyroid/Neck Ultrasound, ECNU/ AIUM\par \par , Department of Otolaryngology\par [facility] [employer] at [facility]\par

## 2023-07-17 NOTE — HISTORY OF PRESENT ILLNESS
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  She had a nodule in the right lobe that measured 3.9 x 1.8 x 2.6 cm and had started thyroid hormone.  She took this for  ~ 20 years but after her last pregnancy she stopped taking it.  She was followed for 8-9 years by Dr. Anadn at [facility] and was stable on US.  She then saw Mary Acuna MD her new Endocrinologist and an FNA biopsy was done with the [facility] but not reflexed as the cytology was benign last August. She remains euthyroid but on last ultrasound in August the nodule in the right lobe had increased now to 5.28 cm.  Of interest she had started taking Kelp tablets after treatment for lobular infiltrating breast cancer treated with double mastectomy last April.  She stopped taking the Kelp in August of this year when the nodule was noted to have grown significantly despite relative stable size of the entire gland. The nodule is more visible now and she feels an intermittent sense of pressure with certain head positions but denies SOB at rest, dysphagia, neck pain or recent voice changes. Her mother had a thyroidectomy for a presumed toxic goiter/nodule but there is no family history of thyroid cancer.  She has no known radiation exposures in her youth. SHe now desires excision due to the growth of the nodule and compressive symptoms, referred by her Endocrinologist. \par  [FreeTextEntry1] : Ophelia returns for her follow up visit after a right hemithyroidectomy for a goiter on 11/24/17.   Her surgical path was c/w adenomatous hyperplasia. She had early TFTs that were in the normal range on 10/10/19. Repeat thyroid function studies July 14, 2023 revealed a TSH of 2.58 and a free T4 of 1.21, T3 free 2.9, Vitamin D 25-OH 52.  Her weight has been stable.  She had subcentimeter left lobe nodules.  She had a f/u ultrasound on 10/15/2021.  This revealed a slightly enlarged left thyroid lobe, 5.1 CM in sagittal height. The left mid lobe demonstrated interval growth of one of her nodules since her prior examination in August 2020 now measuring up to 1.6 cm in greatest dimension and an FNA biopsy was recommended at that time.  She had an FNA biopsy performed in March of last year and this was reported back as benign, [place] category II. the nodule had increased in size and there were no abnormal lymph nodes identified. She denies fever, body aches, cough, cyanosis, chest burning, anosmia or recent known COVID exposures.  All family members at home are well. She has not been vaccinated.

## 2023-07-17 NOTE — DATA REVIEWED
[de-identified] : see HPI [de-identified] : see HPI [de-identified] : Prior cytology report reviewed

## 2023-07-17 NOTE — PROCEDURE
[FreeTextEntry3] : \par ..........................................[facility]\par ...........ULTRASOUND-GUIDED THYROID FINE NEEDLE ASPIRATION BIOPSY REPORT\par \par NAME: BRODY COLLIER.........MR# [id number] .........: 1965 .........DATE 2023.\par \par HISTORY/ INDICATIONS: 58- year-old female s/p right thyroid lobectomy and left lobe nodules.\par \par COMPARISON: Prior ultrasound at the time of fine-needle aspiration biopsy 2022.\par \par PROCEDURE: Physician performed high-resolution ultrasound gray scale imaging and color Doppler supplementation of the thyroid gland and neck was obtained in the longitudinal and transverse planes using a 13 MHz linear transducer with image capture.  All measurements are in centimeters (longitudinal x AP x transverse).  \par \par FINDINGS: The right thyroid lobe has been surgically removed and replaced by echogenic scar tissue.  There are no new nodules or vascular thyroid tissue evident on this exam.  There are approximately 3 complex thyroid lobe nodules in the left thyroid lobe, all under 8 mm greatest dimension without suspicious features for malignancy.  The previously biopsied left lateral anterior nodule has involuted and now has areas of cystic degeneration.  There are no enlarged or morphologically abnormal appearing cervical lymph nodes.\par \par RIGHT LOBE: has been surgically removed and replaced by echogenic scar tissue without new nodularity or vascularized soft tissue\par \par ISTHMUS: Measures 0.30 cm in AP dimension and is heterogeneous in echotexture with normal vascularity.  No nodules are identified.\par \par LEFT LOBE: Is not enlarged, heterogeneous, with normal vascularity on color Doppler and measures 4.58 x 1.83 x 2.68 cm. NODULES: Three subcentimeter nodules are identified in the left lobe including a left hypoechoic upper nodule that is smoothly marginated without microcalcifications measuring 4 mm in greatest dimension, and involuted left lateral anterior nodule previously biopsied and benign now measuring 0.54 x 0.42 x 0.54 cm without microcalcifications smooth margins and wider than tall, and a left inferior complex nodule that is smoothly marginated measuring up to 0.44 cm in greatest dimension without microcalcifications and wider than tall.\par \par PARATHYROID GLANDS: There are no identified enlarged parathyroid glands in the central neck compartment. \par \par LYMPH NODES: Bilateral neck levels I - VI were examined.  There are several benign appearing subcentimeter lymph nodes identified at neck levels II- III bilaterally (lateral neck), all with echogenic hilar lines, no calcifications or cystic degeneration and have a short long axis ratio < 0.5 in the transverse plane.  There are no enlarged or abnormal appearing central compartment, level VI lymph nodes.\par \par IMPRESSION: A 58-year-old female status post a right thyroid lobectomy and isthmusectomy with several subcentimeter nodules within the left thyroid lobe.  A previously biopsied left lateral anterior nodule has involuted after FNA biopsy and benign.\par \par RECOMMENDATIONS: Repeat thyroid ultrasound in 2 years as well as continued monitoring of TSH. \par \par Electronically signed by referring, interpreting and reporting physician Philip Milner MD on 2023, 2:59 PM.\par \par [facility]\par [address], [address],  [place] [address]\par [phone] (voice), [phone] (fax)

## 2024-02-29 NOTE — PRE-OP CHECKLIST - LAST TOOK
solids Detail Level: Zone Render In Strict Bullet Format?: No Initiate Treatment: Ketoconazole cream to inguinal crease bid until cleared.

## 2024-03-26 ENCOUNTER — TRANSCRIPTION ENCOUNTER (OUTPATIENT)
Age: 59
End: 2024-03-26

## 2024-03-28 ENCOUNTER — TRANSCRIPTION ENCOUNTER (OUTPATIENT)
Age: 59
End: 2024-03-28

## 2024-07-12 ENCOUNTER — OUTPATIENT (OUTPATIENT)
Dept: OUTPATIENT SERVICES | Facility: HOSPITAL | Age: 59
LOS: 1 days | End: 2024-07-12
Payer: COMMERCIAL

## 2024-07-12 ENCOUNTER — APPOINTMENT (OUTPATIENT)
Dept: ULTRASOUND IMAGING | Facility: HOSPITAL | Age: 59
End: 2024-07-12
Payer: COMMERCIAL

## 2024-07-12 DIAGNOSIS — Z90.09 ACQUIRED ABSENCE OF OTHER PART OF HEAD AND NECK: Chronic | ICD-10-CM

## 2024-07-12 DIAGNOSIS — Z90.13 ACQUIRED ABSENCE OF BILATERAL BREASTS AND NIPPLES: Chronic | ICD-10-CM

## 2024-07-12 PROCEDURE — 76536 US EXAM OF HEAD AND NECK: CPT

## 2024-07-12 PROCEDURE — 76536 US EXAM OF HEAD AND NECK: CPT | Mod: 26

## 2024-07-15 ENCOUNTER — NON-APPOINTMENT (OUTPATIENT)
Age: 59
End: 2024-07-15

## 2024-07-15 DIAGNOSIS — E04.2 NONTOXIC MULTINODULAR GOITER: ICD-10-CM

## 2024-07-15 DIAGNOSIS — E89.0 POSTPROCEDURAL HYPOTHYROIDISM: ICD-10-CM

## 2024-07-22 ENCOUNTER — APPOINTMENT (OUTPATIENT)
Dept: OTOLARYNGOLOGY | Facility: CLINIC | Age: 59
End: 2024-07-22

## 2024-07-29 ENCOUNTER — APPOINTMENT (OUTPATIENT)
Dept: OTOLARYNGOLOGY | Facility: CLINIC | Age: 59
End: 2024-07-29
Payer: COMMERCIAL

## 2024-07-29 VITALS
SYSTOLIC BLOOD PRESSURE: 107 MMHG | WEIGHT: 135 LBS | DIASTOLIC BLOOD PRESSURE: 66 MMHG | BODY MASS INDEX: 24.84 KG/M2 | OXYGEN SATURATION: 96 % | HEART RATE: 82 BPM | TEMPERATURE: 97.3 F | HEIGHT: 62 IN

## 2024-07-29 DIAGNOSIS — D44.0 NEOPLASM OF UNCERTAIN BEHAVIOR OF THYROID GLAND: ICD-10-CM

## 2024-07-29 DIAGNOSIS — H61.22 IMPACTED CERUMEN, LEFT EAR: ICD-10-CM

## 2024-07-29 DIAGNOSIS — D34 BENIGN NEOPLASM OF THYROID GLAND: ICD-10-CM

## 2024-07-29 DIAGNOSIS — E04.2 NONTOXIC MULTINODULAR GOITER: ICD-10-CM

## 2024-07-29 DIAGNOSIS — E89.0 POSTPROCEDURAL HYPOTHYROIDISM: ICD-10-CM

## 2024-07-29 PROCEDURE — 99215 OFFICE O/P EST HI 40 MIN: CPT | Mod: 25

## 2024-07-29 NOTE — HISTORY OF PRESENT ILLNESS
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  She had a nodule in the right lobe that measured 3.9 x 1.8 x 2.6 cm and had started thyroid hormone.  She took this for  ~ 20 years but after her last pregnancy she stopped taking it.  She was followed for 8-9 years by Dr. Anand at [facility] and was stable on US.  She then saw Mary Acuna MD her new Endocrinologist and an FNA biopsy was done with the [facility] but not reflexed as the cytology was benign last August. She remains euthyroid but on last ultrasound in August the nodule in the right lobe had increased now to 5.28 cm.  Of interest she had started taking Kelp tablets after treatment for lobular infiltrating breast cancer treated with double mastectomy last April.  She stopped taking the Kelp in August of this year when the nodule was noted to have grown significantly despite relative stable size of the entire gland. The nodule is more visible now and she feels an intermittent sense of pressure with certain head positions but denies SOB at rest, dysphagia, neck pain or recent voice changes. Her mother had a thyroidectomy for a presumed toxic goiter/nodule but there is no family history of thyroid cancer.  She has no known radiation exposures in her youth. SHe now desires excision due to the growth of the nodule and compressive symptoms, referred by her Endocrinologist.  -------------------------------------------------------------------------------------------------------------------------------------------------------------------------------------   [FreeTextEntry1] : Ophelia returns for her follow up visit after a right hemithyroidectomy for a goiter on 11/24/17.   Her surgical path was c/w adenomatous hyperplasia. She had early TFTs that were in the normal range on 10/10/19. Repeat thyroid function studies July 14, 2023 revealed a TSH of 2.58 and a free T4 of 1.21, T3 free 2.9, Vitamin D 25-OH 52. Her last set of labs in January 2024 : TSH 1.96, T4 free 1.39, free T3 3.0.   Her weight has been stable.  She had sub centimeter left lobe nodules.  She had a f/u ultrasound on 10/15/2021.  This revealed a slightly enlarged left thyroid lobe, 5.1 CM in sagittal height. The left mid lobe demonstrated interval growth of one of her nodules since her prior examination in August 2020 now measuring up to 1.6 cm in greatest dimension and an FNA biopsy was recommended at that time.  She had an FNA biopsy performed in March of 2022 and this was reported back as benign, [place] category II. The nodule had increased in size and there were no abnormal lymph nodes identified.  Her last ultrasound of the neck on 7/12/2024 revealed a mildly enlarged left lobe of her thyroid gland measuring 5.6 cm in sagittal height.  The largest nodules were described including an upper pole 6 x 4 x 6 mm nodule, a mid lobe 6 x 4 x 7 mm nodule, a mid lobe 5 x 4 x 6 mm nodule, and a lower pole 9 x 6 x 7 mm nodule.  She denies fever, body aches, cough, cyanosis, chest burning, anosmia or recent known COVID exposures.  All family members at home are well. She has not been vaccinated.

## 2024-07-29 NOTE — HISTORY OF PRESENT ILLNESS
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  She had a nodule in the right lobe that measured 3.9 x 1.8 x 2.6 cm and had started thyroid hormone.  She took this for  ~ 20 years but after her last pregnancy she stopped taking it.  She was followed for 8-9 years by Dr. Anand at [facility] and was stable on US.  She then saw Mary Acuna MD her new Endocrinologist and an FNA biopsy was done with the [facility] but not reflexed as the cytology was benign last August. She remains euthyroid but on last ultrasound in August the nodule in the right lobe had increased now to 5.28 cm.  Of interest she had started taking Kelp tablets after treatment for lobular infiltrating breast cancer treated with double mastectomy last April.  She stopped taking the Kelp in August of this year when the nodule was noted to have grown significantly despite relative stable size of the entire gland. The nodule is more visible now and she feels an intermittent sense of pressure with certain head positions but denies SOB at rest, dysphagia, neck pain or recent voice changes. Her mother had a thyroidectomy for a presumed toxic goiter/nodule but there is no family history of thyroid cancer.  She has no known radiation exposures in her youth. SHe now desires excision due to the growth of the nodule and compressive symptoms, referred by her Endocrinologist.  -------------------------------------------------------------------------------------------------------------------------------------------------------------------------------------   [FreeTextEntry1] : Ophelia returns for her follow up visit after a right hemithyroidectomy for a goiter on 11/24/17.   Her surgical path was c/w adenomatous hyperplasia. She had early TFTs that were in the normal range on 10/10/19. Repeat thyroid function studies July 14, 2023 revealed a TSH of 2.58 and a free T4 of 1.21, T3 free 2.9, Vitamin D 25-OH 52. Her last set of labs in January 2024 : TSH 1.96, T4 free 1.39, free T3 3.0.   Her weight has been stable.  She had sub centimeter left lobe nodules.  She had a f/u ultrasound on 10/15/2021.  This revealed a slightly enlarged left thyroid lobe, 5.1 CM in sagittal height. The left mid lobe demonstrated interval growth of one of her nodules since her prior examination in August 2020 now measuring up to 1.6 cm in greatest dimension and an FNA biopsy was recommended at that time.  She had an FNA biopsy performed in March of 2022 and this was reported back as benign, [place] category II. The nodule had increased in size and there were no abnormal lymph nodes identified.  Her last ultrasound of the neck on 7/12/2024 revealed a mildly enlarged left lobe of her thyroid gland measuring 5.6 cm in sagittal height.  The largest nodules were described including an upper pole 6 x 4 x 6 mm nodule, a mid lobe 6 x 4 x 7 mm nodule, a mid lobe 5 x 4 x 6 mm nodule, and a lower pole 9 x 6 x 7 mm nodule.  She denies fever, body aches, cough, cyanosis, chest burning, anosmia or recent known COVID exposures.  All family members at home are well. She has not been vaccinated.

## 2024-07-29 NOTE — DATA REVIEWED
[de-identified] : see HPI [de-identified] : see HPI [de-identified] : Prior cytology report reviewed

## 2024-07-29 NOTE — PROCEDURE
[Cerumen Impaction] : Cerumen Impaction [Same] : same as the Pre Op Dx. [] : Removal of Cerumen [FreeTextEntry6] : Copious cerumen removed from left ear canal with instrumentation.  TM is intact with normal mobility and improved hearing to finger rub.  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

## 2024-07-29 NOTE — DATA REVIEWED
[de-identified] : see HPI [de-identified] : see HPI [de-identified] : Prior cytology report reviewed

## 2024-07-29 NOTE — CONSULT LETTER
[Dear  ___] : Dear  [unfilled], [Consult Letter:] : I had the pleasure of evaluating your patient, [unfilled]. [Please see my note below.] : Please see my note below. [Consult Closing:] : Thank you very much for allowing me to participate in the care of this patient.  If you have any questions, please do not hesitate to contact me. [Sincerely,] : Sincerely, [DrKarla  ___] : Dr. TAVERA [FreeTextEntry3] : \par  Philip Milner M.D., FACS, ECNU\par  Director Center for Thyroid & Parathyroid Surgery\par  [facility]\par  Certified in Thyroid/Parathyroid/Neck Ultrasound, ECNU/ AIUM\par  \par  , Department of Otolaryngology\par  [facility] [employer] [facility]\par

## 2024-07-29 NOTE — CONSULT LETTER
[Dear  ___] : Dear  [unfilled], [Consult Letter:] : I had the pleasure of evaluating your patient, [unfilled]. [Please see my note below.] : Please see my note below. [Consult Closing:] : Thank you very much for allowing me to participate in the care of this patient.  If you have any questions, please do not hesitate to contact me. [Sincerely,] : Sincerely, [DrKarla  ___] : Dr. TAVERA [FreeTextEntry3] : \par  Philip Milner M.D., FACS, ECNU\par  Director Center for Thyroid & Parathyroid Surgery\par  [facility] [place] [facility]\par  Certified in Thyroid/Parathyroid/Neck Ultrasound, ECNU/ AIUM\par  \par  , Department of Otolaryngology\par  [facility] [employer] at [facility]\par

## 2024-07-29 NOTE — PROCEDURE
[Cerumen Impaction] : Cerumen Impaction [Same] : same as the Pre Op Dx. 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  [FreeTextEntry6] : Copious cerumen removed from left ear canal with instrumentation.  TM is intact with normal mobility and improved hearing to finger rub.
